# Patient Record
Sex: FEMALE | Race: WHITE | Employment: OTHER | URBAN - METROPOLITAN AREA
[De-identification: names, ages, dates, MRNs, and addresses within clinical notes are randomized per-mention and may not be internally consistent; named-entity substitution may affect disease eponyms.]

---

## 2017-05-04 ENCOUNTER — OFFICE VISIT (OUTPATIENT)
Dept: INTERNAL MEDICINE | Facility: CLINIC | Age: 82
End: 2017-05-04
Payer: COMMERCIAL

## 2017-05-04 VITALS
BODY MASS INDEX: 27.97 KG/M2 | TEMPERATURE: 98.4 F | OXYGEN SATURATION: 98 % | SYSTOLIC BLOOD PRESSURE: 126 MMHG | WEIGHT: 152 LBS | HEIGHT: 62 IN | HEART RATE: 67 BPM | DIASTOLIC BLOOD PRESSURE: 76 MMHG

## 2017-05-04 DIAGNOSIS — J45.30 MILD PERSISTENT ASTHMA WITHOUT COMPLICATION: ICD-10-CM

## 2017-05-04 DIAGNOSIS — Z13.1 SCREENING FOR DIABETES MELLITUS: ICD-10-CM

## 2017-05-04 DIAGNOSIS — Z00.00 MEDICARE ANNUAL WELLNESS VISIT, SUBSEQUENT: Primary | ICD-10-CM

## 2017-05-04 DIAGNOSIS — R35.0 URINARY FREQUENCY: ICD-10-CM

## 2017-05-04 LAB
ALBUMIN UR-MCNC: NEGATIVE MG/DL
APPEARANCE UR: CLEAR
BILIRUB UR QL STRIP: NEGATIVE
COLOR UR AUTO: YELLOW
GLUCOSE SERPL-MCNC: 96 MG/DL (ref 70–99)
GLUCOSE UR STRIP-MCNC: NEGATIVE MG/DL
HGB UR QL STRIP: NEGATIVE
KETONES UR STRIP-MCNC: NEGATIVE MG/DL
LEUKOCYTE ESTERASE UR QL STRIP: NEGATIVE
NITRATE UR QL: NEGATIVE
PH UR STRIP: 7.5 PH (ref 5–7)
SP GR UR STRIP: <=1.005 (ref 1–1.03)
URN SPEC COLLECT METH UR: ABNORMAL
UROBILINOGEN UR STRIP-ACNC: 0.2 EU/DL (ref 0.2–1)

## 2017-05-04 PROCEDURE — 81003 URINALYSIS AUTO W/O SCOPE: CPT | Performed by: INTERNAL MEDICINE

## 2017-05-04 PROCEDURE — 82947 ASSAY GLUCOSE BLOOD QUANT: CPT | Performed by: INTERNAL MEDICINE

## 2017-05-04 PROCEDURE — 36415 COLL VENOUS BLD VENIPUNCTURE: CPT | Performed by: INTERNAL MEDICINE

## 2017-05-04 PROCEDURE — 99397 PER PM REEVAL EST PAT 65+ YR: CPT | Performed by: INTERNAL MEDICINE

## 2017-05-04 NOTE — MR AVS SNAPSHOT
After Visit Summary   5/4/2017    Abril Aguilar    MRN: [id number]           Patient Information     Date Of Birth          1935        Visit Information        Provider Department      5/4/2017 9:00 AM Julio Cesar Carrero MD [facility]        Today's Diagnoses     Medicare annual wellness visit, subsequent    -  1    Mild persistent asthma without complication        Urinary frequency        Screening for diabetes mellitus          Care Instructions          Services Typically covered by Medicare Recommended Completed   Vaccines    Pneumonoccol    Influenza    Hepatitis B (if medium/high risk)     Once for patients after age 65    Yearly  Medium/high risk factors:    End Stage Kidney Disease    Hemophiliacs who received Factor XIII or IX concentrates    Clients of institutions for developmentally disabled    Persons who live in same house as a Hepatitis B carrier    Homosexual men    Illicit injectable drug users    Health care workers     Mammogram Covered: One-time screen between age 35-39, annually for age 40+     Pap and Pelvic Exam Covered: Annually if  high risk,  or childbearing age with abnormal Pap in last 3 years.  Q24 months for all other women     Prostate Cancer Screening    Digital rectal exam    PSA Covered: Annually for all men > age 50     Corolrectal Cancer Screening Screening colonoscopy every 10 years, more often for high risk patients     Diabetes Self-Management Training Requires referral by treating physician for patient with diabetes     Diabetes Screening    Fasting blood sugar or glucose tolerance test   Once yearly, twice yearly if prediabetic     Cardiovascular Screening Blood Tests    Total Cholesterol    HDL    Triglycerides Every 5 years     Medical Nutrition Therapy for Diabetes or Renal Disease Requires referral by treating physician for patient with diabetes or kidney disease     Glaucoma Screening Annually for patients with one of the  following risk factors:    Diabetes Mellitus    Family history of Glaucoma    -American age 50 and over    -American age 65 and over     Bone Mass Measurement Every 24 months if one of the following risk factors:    Estrogen deficiency    Vertebral abnormalities on x-ray indicative of Osteoporosis, Osteopenia, or Vertebral fracture    Receiving/expected to receive the equivalent of at least 5 mg of Prednisone per day for > 3 months    Hyperparathyroidism    Patient being monitored for response to Osteoporosis Therapy     One-time AAA screen  Must be ordered as part of Medicare IPPE   Any patient with a family history of AAA    Males Age 65-75, with history of smoking at least 100 cigarettes in lifetime     Smoking Cessation Counseling Beneficiaries who use tobacco are eligible to receive 2 cessation attempts per year; each attempt includes maximum of 4 sessions     HIV Screening Annually for beneficiaries at increased risk:       Increased risk for HIV infection is defined in the  National Coverage Determinations (NCD) Manual,  Publication 100-03 Sections 190.14 (diagnostic) and 210.7 (screening). See http://www.cms.gov/manuals/downloads/xco868w7_Swzc6.pdf and http://www.cms.gov/manuals/downloads/peo804a1_Vjgf4.pdf on the Internet.  Three times per pregnancy for beneficiaries who are pregnant.     Future Annual Wellness Visit Annually, for all beneficiaries.             Follow-ups after your visit        Who to contact     If you have questions or need follow up information about today's clinic visit or your schedule please contact [facility] directly at [phone].  Normal or non-critical lab and imaging results will be communicated to you by MyChart, letter or phone within 4 business days after the clinic has received the results. If you do not hear from us within 7 days, please contact the clinic through MyChart or phone. If you have a critical or abnormal lab result, we  "will notify you by phone as soon as possible.  Submit refill requests through [employer] or call your pharmacy and they will forward the refill request to us. Please allow 3 business days for your refill to be completed.          Additional Information About Your Visit        .[employer] Information     [email or web address] lets you send messages to your doctor, view your test results, renew your prescriptions, schedule appointments and more. To sign up, go to [email or web address] . Click on \"Log in\" on the left side of the screen, which will take you to the Welcome page. Then click on \"Sign up Now\" on the right side of the page.     You will be asked to enter the access code listed below, as well as some personal information. Please follow the directions to create your username and password.     Your access code is: [email or web address]  Expires: 2017 10:32 PM     Your access code will  in 90 days. If you need help or a new code, please call your [place] clinic or [phone].        Care EveryWhere ID     This is your Care EveryWhere ID. This could be used by other organizations to access your [place] medical records  [id number]        Your Vitals Were     Pulse Temperature Height Pulse Oximetry Breastfeeding? BMI (Body Mass Index)    67 98.4  F (36.9  C) (Oral) 5' 2\" (1.575 m) 98% No 27.8 kg/m2       Blood Pressure from Last 3 Encounters:   17 126/76   16 166/82   16 130/72    Weight from Last 3 Encounters:   17 152 lb (68.9 kg)   16 147 lb (66.7 kg)   16 152 lb (68.9 kg)              We Performed the Following     Glucose     UA reflex to Microscopic and Culture          Today's Medication Changes          These changes are accurate as of: 17 10:32 PM.  If you have any questions, ask your nurse or doctor.               Stop taking these medicines if you haven't already. Please contact your care team if you have questions.     fluticasone-salmeterol 115-21 MCG/ACT Inhaler   Commonly " known as:  ADVAIR-HFA   Stopped by:  Julio Cesar Carrero MD                    Primary Care Provider Office Phone # Fax #    Julio Cesar Carrero [phone]       [facility] [address]  [facility] [address]        Thank you!     Thank you for choosing [facility]  for your care. Our goal is always to provide you with excellent care. Hearing back from our patients is one way we can continue to improve our services. Please take a few minutes to complete the written survey that you may receive in the mail after your visit with us. Thank you!             Your Updated Medication List - Protect others around you: Learn how to safely use, store and throw away your medicines at www.disposemymeds.org.          This list is accurate as of: 5/4/17 10:32 PM.  Always use your most recent med list.                   Brand Name Dispense Instructions for use    aspirin 81 MG tablet     100    ONE DAILY       Azelaic Acid 15 % gel     50 g    Apply 0.5 inches topically 2 times daily Massage thin film gently into afected areas morning and evening. Profile only-       fluticasone-vilanterol 100-25 MCG/INH oral inhaler    BREO ELLIPTA     Inhale 1 puff into the lungs daily       multivitamin Tabs tablet      1 daily       triamcinolone 0.5 % cream    KENALOG    30 g    Apply sparingly to affected area twice a day  Until rash resolved       vitamin D 2000 UNITS tablet     100 tablet    Take 2,000 Units by mouth daily

## 2017-05-04 NOTE — NURSING NOTE
"Chief Complaint   Patient presents with     Wellness Visit     fasting       Initial /76  Pulse 67  Temp 98.4  F (36.9  C) (Oral)  Ht 5' 2\" (1.575 m)  Wt 152 lb (68.9 kg)  SpO2 98%  Breastfeeding? No  BMI 27.8 kg/m2 Estimated body mass index is 27.8 kg/(m^2) as calculated from the following:    Height as of this encounter: 5' 2\" (1.575 m).    Weight as of this encounter: 152 lb (68.9 kg).  Medication Reconciliation: complete    "

## 2017-05-04 NOTE — PATIENT INSTRUCTIONS
Services Typically covered by Medicare Recommended Completed   Vaccines    Pneumonoccol    Influenza    Hepatitis B (if medium/high risk)     Once for patients after age 65    Yearly  Medium/high risk factors:    End Stage Kidney Disease    Hemophiliacs who received Factor XIII or IX concentrates    Clients of institutions for developmentally disabled    Persons who live in same house as a Hepatitis B carrier    Homosexual men    Illicit injectable drug users    Health care workers     Mammogram Covered: One-time screen between age 35-39, annually for age 40+     Pap and Pelvic Exam Covered: Annually if  high risk,  or childbearing age with abnormal Pap in last 3 years.  Q24 months for all other women     Prostate Cancer Screening    Digital rectal exam    PSA Covered: Annually for all men > age 50     Corolrectal Cancer Screening Screening colonoscopy every 10 years, more often for high risk patients     Diabetes Self-Management Training Requires referral by treating physician for patient with diabetes     Diabetes Screening    Fasting blood sugar or glucose tolerance test   Once yearly, twice yearly if prediabetic     Cardiovascular Screening Blood Tests    Total Cholesterol    HDL    Triglycerides Every 5 years     Medical Nutrition Therapy for Diabetes or Renal Disease Requires referral by treating physician for patient with diabetes or kidney disease     Glaucoma Screening Annually for patients with one of the following risk factors:    Diabetes Mellitus    Family history of Glaucoma    -American age 50 and over    -American age 65 and over     Bone Mass Measurement Every 24 months if one of the following risk factors:    Estrogen deficiency    Vertebral abnormalities on x-ray indicative of Osteoporosis, Osteopenia, or Vertebral fracture    Receiving/expected to receive the equivalent of at least 5 mg of Prednisone per day for > 3 months    Hyperparathyroidism    Patient being monitored for  response to Osteoporosis Therapy     One-time AAA screen  Must be ordered as part of Medicare IPPE   Any patient with a family history of AAA    Males Age 65-75, with history of smoking at least 100 cigarettes in lifetime     Smoking Cessation Counseling Beneficiaries who use tobacco are eligible to receive 2 cessation attempts per year; each attempt includes maximum of 4 sessions     HIV Screening Annually for beneficiaries at increased risk:       Increased risk for HIV infection is defined in the  National Coverage Determinations (NCD) Manual,  Publication 100-03 Sections 190.14 (diagnostic) and 210.7 (screening). See http://www.cms.gov/manuals/downloads/vxi748s9_Pqdj8.pdf and http://www.cms.gov/manuals/downloads/hcz050x4_Amdo7.pdf on the Internet.  Three times per pregnancy for beneficiaries who are pregnant.     Future Annual Wellness Visit Annually, for all beneficiaries.

## 2017-05-05 ASSESSMENT — ASTHMA QUESTIONNAIRES: ACT_TOTALSCORE: 25

## 2017-08-09 ENCOUNTER — TRANSFERRED RECORDS (OUTPATIENT)
Dept: HEALTH INFORMATION MANAGEMENT | Facility: CLINIC | Age: 82
End: 2017-08-09

## 2018-05-17 ENCOUNTER — OFFICE VISIT (OUTPATIENT)
Dept: INTERNAL MEDICINE | Facility: CLINIC | Age: 83
End: 2018-05-17
Payer: COMMERCIAL

## 2018-05-17 VITALS
SYSTOLIC BLOOD PRESSURE: 126 MMHG | TEMPERATURE: 99 F | HEIGHT: 62 IN | HEART RATE: 90 BPM | WEIGHT: 147 LBS | RESPIRATION RATE: 16 BRPM | DIASTOLIC BLOOD PRESSURE: 74 MMHG | BODY MASS INDEX: 27.05 KG/M2

## 2018-05-17 DIAGNOSIS — H61.22 IMPACTED CERUMEN OF LEFT EAR: ICD-10-CM

## 2018-05-17 DIAGNOSIS — Z13.1 SCREENING FOR DIABETES MELLITUS: Primary | ICD-10-CM

## 2018-05-17 DIAGNOSIS — J45.30 MILD PERSISTENT ASTHMA WITHOUT COMPLICATION: ICD-10-CM

## 2018-05-17 DIAGNOSIS — Z00.00 MEDICARE ANNUAL WELLNESS VISIT, SUBSEQUENT: ICD-10-CM

## 2018-05-17 LAB — GLUCOSE SERPL-MCNC: 95 MG/DL (ref 70–99)

## 2018-05-17 PROCEDURE — 69210 REMOVE IMPACTED EAR WAX UNI: CPT | Mod: 53 | Performed by: INTERNAL MEDICINE

## 2018-05-17 PROCEDURE — 82947 ASSAY GLUCOSE BLOOD QUANT: CPT | Performed by: INTERNAL MEDICINE

## 2018-05-17 PROCEDURE — 36415 COLL VENOUS BLD VENIPUNCTURE: CPT | Performed by: INTERNAL MEDICINE

## 2018-05-17 PROCEDURE — 99397 PER PM REEVAL EST PAT 65+ YR: CPT | Mod: 25 | Performed by: INTERNAL MEDICINE

## 2018-05-17 ASSESSMENT — ACTIVITIES OF DAILY LIVING (ADL)
CURRENT_FUNCTION: NO ASSISTANCE NEEDED
I_NEED_ASSISTANCE_FOR_THE_FOLLOWING_DAILY_ACTIVITIES:: NO ASSISTANCE IS NEEDED

## 2018-05-17 ASSESSMENT — PAIN SCALES - GENERAL: PAINLEVEL: NO PAIN (0)

## 2018-05-17 NOTE — PROGRESS NOTES
SUBJECTIVE:   Abril Aguilar is a 82 year old female who presents for Preventive Visit.    Are you in the first 12 months of your Medicare Part B coverage?  No    Healthy Habits:  Answers for HPI/ROS submitted by the patient on 5/17/2018   Annual Exam:  Getting at least 3 servings of Calcium per day:: Yes  Bi-annual eye exam:: Yes  Dental care twice a year:: NO  Sleep apnea or symptoms of sleep apnea:: None  Diet:: Regular (no restrictions)  Taking medications regularly:: Yes  Medication side effects:: None  Additional concerns today:: No  Activities of Daily Living: no assistance needed  Home safety: lack of grab bars in the bathroom  Hearing Impairment:: no hearing concerns  PHQ-2 Score: 0        Ability to successfully perform activities of daily living: Yes, no assistance needed    Home safety:  none identified     Hearing impairment: No    Fall risk:  Fallen 2 or more times in the past year?: No  Any fall with injury in the past year?: No        COGNITIVE SCREEN  1) Repeat 3 items (Banana, Sunrise, Chair)    2) Clock draw: NORMAL  3) 3 item recall: Recalls 3 objects  Results: NORMAL clock, 1-2 items recalled: COGNITIVE IMPAIRMENT LESS LIKELY    Mini-CogTM Copyright S Melany. Licensed by the author for use in [facility]; reprinted with permission (soob@[facility]). All rights reserved.                Reviewed and updated as needed this visit by clinical staff         Reviewed and updated as needed this visit by Provider        Social History   Substance Use Topics     Smoking status: Never Smoker     Smokeless tobacco: Never Used     Alcohol use No       If you drink alcohol do you typically have >3 drinks per day or >7 drinks per week? No                        Today's PHQ-2 Score:   PHQ-2 ( 1999 Pf[employer]) 5/4/2017 5/2/2016   Q1: Little interest or pleasure in doing things 0 0   Q2: Feeling down, depressed or hopeless 0 0   PHQ-2 Score 0 0       Do you feel safe in your environment - YES    Do  you have a Health Care Directive?: Yes: Patient states has Advance Directive and will bring in a copy to clinic.    Current providers sharing in care for this patient include:   Patient Care Team:  Julio Cesar Carrero MD as PCP - General (Internal Medicine)    The following health maintenance items are reviewed in Epic and correct as of today:  Health Maintenance   Topic Date Due     ASTHMA ACTION PLAN Q1 YR  05/02/2017     ADVANCE DIRECTIVE PLANNING Q5 YRS  10/09/2017     ASTHMA CONTROL TEST Q6 MOS  11/04/2017     FALL RISK ASSESSMENT  05/04/2018     INFLUENZA VACCINE (Season Ended) 09/01/2018     TETANUS IMMUNIZATION (SYSTEM ASSIGNED)  05/27/2025     DEXA SCAN SCREENING (SYSTEM ASSIGNED)  Completed     PNEUMOCOCCAL  Completed     Labs reviewed in EPIC        ROS:  CONSTITUTIONAL: NEGATIVE for fever, chills. Weight down 5 pounds with diet  INTEGUMENTARY/SKIN: NEGATIVE for worrisome rashes, moles or lesions  EYES: NEGATIVE for recent vision changes or irritation.   Minimal vision right eye. Gets injections every 8 weeks for macular degeneration  ENT/MOUTH: NEGATIVE for ear, mouth and throat problems  RESP: NEGATIVE for significant cough or SOB with meds. Seeing Pulmonary in August for follow-up  BREAST: NEGATIVE for masses, tenderness or discharge. Refuses further mammograms  CV: NEGATIVE for chest pain, palpitations or peripheral edema  GI: NEGATIVE for nausea, abdominal pain, heartburn, or change in bowel habits  : NEGATIVE for frequency, dysuria, or hematuria. Has nocturia x2-3.  2 cups coffee in AM. Rare soda. Refuses meds for treatment. No ETOH.   MUSCULOSKELETAL: NEGATIVE for significant arthralgias or myalgia that limit activity. Rare stiffness in thumbs or low back.  Better with stretching in AM  NEURO: NEGATIVE for weakness, dizziness or paresthesias  ENDOCRINE: NEGATIVE for temperature intolerance.   HEME: NEGATIVE for bleeding problems  PSYCHIATRIC: NEGATIVE for changes in mood or affect    OBJECTIVE:   BP  "126/74  Pulse 90  Temp 99  F (37.2  C) (Oral)  Resp 16  Ht 5' 2\" (1.575 m)  Wt 147 lb (66.7 kg)  BMI 26.89 kg/m2 Estimated body mass index is 27.8 kg/(m^2) as calculated from the following:    Height as of 5/4/17: 5' 2\" (1.575 m).    Weight as of 5/4/17: 152 lb (68.9 kg).  EXAM:   General appearance - healthy, alert, no distress  Skin - No rashes or lesions.  Head - normocephalic, atraumatic  Eyes - KURT, EOMI, fundi exam with nondilated pupils negative.  Ears - External ears normal.  Right canal clear. Left canal obstructed with cerumen caught in hairs. Tried to remove but unable due top pain with hairs  Nose/Sinuses - Nares normal. Septum midline. Mucosa normal. No drainage or sinus tenderness.  Oropharynx - No erythema, no adenopathy, no exudates.  Neck - Supple without adenopathy or thyromegaly. No bruits.  Lungs - Clear to auscultation without wheezes/rhonchi.  Heart - Regular rate and rhythm without murmurs, clicks, or gallops.  Nodes - No supraclavicular, axillary, or inguinal adenopathy palpable.  Breasts - deferred  Abdomen - Abdomen soft, non-tender. BS normal. No masses or hepatosplenomegaly palpable. No bruits.  Extremities -No cyanosis, clubbing or edema.    Musculoskeletal - Spine ROM normal. Muscular strength intact.   Peripheral pulses - radial=4/4, femoral=4/4, posterior tibial=4/4, dorsalis pedis=4/4,  Neuro - Gait normal. Reflexes normal and symmetric. Sensation grossly WNL.  Genital/Rectal - deferred      ASSESSMENT / PLAN:   1. Medicare annual wellness visit, subsequent  Screening lab as below. Pt refuses other HCM    2. Screening for diabetes mellitus  - Glucose    3. Mild persistent asthma without complication  Controlled. Continue meds prescribed by Pulmonary. Will see Dr Roberts this August for annual f/u    4. Impacted cerumen of left ear  Not able to tolerate procedure in clinic as cerumen wrapped around ear canal hair. Will treat with Debrox      End of Life Planning:  Patient " "currently has an advanced directive: Yes.  Practitioner is supportive of decision.    COUNSELING:  Reviewed preventive health counseling, as reflected in patient instructions    BP Screening:   Last 3 BP Readings:    BP Readings from Last 3 Encounters:   05/17/18 126/74   05/04/17 126/76   09/28/16 166/82       The following was recommended to the patient:  Re-screen BP within a year and recommended lifestyle modifications    Estimated body mass index is 27.8 kg/(m^2) as calculated from the following:    Height as of 5/4/17: 5' 2\" (1.575 m).    Weight as of 5/4/17: 152 lb (68.9 kg).       reports that she has never smoked. She has never used smokeless tobacco.      Appropriate preventive services were discussed with this patient, including applicable screening as appropriate for cardiovascular disease, diabetes, osteopenia/osteoporosis, and glaucoma.  As appropriate for age/gender, discussed screening for colorectal cancer, prostate cancer, breast cancer, and cervical cancer. Checklist reviewing preventive services available has been given to the patient.    Reviewed patients plan of care and provided an AVS. The Basic Care Plan (routine screening as documented in Health Maintenance) for Abril meets the Care Plan requirement. This Care Plan has been established and reviewed with the Patient.    Counseling Resources:  ATP IV Guidelines  Pooled Cohorts Equation Calculator  Breast Cancer Risk Calculator  FRAX Risk Assessment  ICSI Preventive Guidelines  Dietary Guidelines for Americans, 2010  USDA's MyPlate  ASA Prophylaxis  Lung CA Screening      PLAN:  Continue current meds   Check with insurance re: Shingrix vaccine for shingles prevention. Check if covered and if they prefer it to be given in clinic or pharmacy. Schedule nurse only appt if wish to receive in clinic. 2 shot series done 2-6 months apart  Glucose/sugar lab today  Debrox 4 drops in left ear and let sit for 2 minutes. Then irrigate with lukewarm water " and do daily for 1 week  Pt declines mammogram and bone density study/ treatment      Julio Cesar Carrero MD  [facility]

## 2018-05-17 NOTE — MR AVS SNAPSHOT
"              After Visit Summary   5/17/2018    Abril Aguilar    MRN: [id number]           Patient Information     Date Of Birth          1935        Visit Information        Provider Department      5/17/2018 8:30 AM Julio Cesar Carrero MD [facility]        Care Instructions    Continue current meds   Check with insurance re: Shingrix vaccine for shingles prevention. Check if covered and if they prefer it to be given in clinic or pharmacy. Schedule nurse only appt if wish to receive in clinic. 2 shot series done 2-6 months apart  Glucose/sugar lab today  Debrox 4 drops in left ear and let sit for 2 minutes. Then irrigate with lukewarm water and do daily for 1 week                   Follow-ups after your visit        Who to contact     If you have questions or need follow up information about today's clinic visit or your schedule please contact [facility] directly at [phone].  Normal or non-critical lab and imaging results will be communicated to you by [employer]hart, letter or phone within 4 business days after the clinic has received the results. If you do not hear from us within 7 days, please contact the clinic through [employer]hart or phone. If you have a critical or abnormal lab result, we will notify you by phone as soon as possible.  Submit refill requests through [employer] or call your pharmacy and they will forward the refill request to us. Please allow 3 business days for your refill to be completed.          Additional Information About Your Visit        [employer]row[employer] Information     [employer] lets you send messages to your doctor, view your test results, renew your prescriptions, schedule appointments and more. To sign up, go to [email or web address] . Click on \"Log in\" on the left side of the screen, which will take you to the Welcome page. Then click on \"Sign up Now\" on the right side of the page.     You will be asked to enter the access code listed below, as " "well as some personal information. Please follow the directions to create your username and password.     Your access code is: [email or web address]  Expires: 8/15/2018  9:08 AM     Your access code will  in 90 days. If you need help or a new code, please call your [place] clinic or [phone].        Care EveryWhere ID     This is your Care EveryWhere ID. This could be used by other organizations to access your [place] medical records  [id number]        Your Vitals Were     Pulse Temperature Respirations Height BMI (Body Mass Index)       90 99  F (37.2  C) (Oral) 16 5' 2\" (1.575 m) 26.89 kg/m2        Blood Pressure from Last 3 Encounters:   18 126/74   17 126/76   16 166/82    Weight from Last 3 Encounters:   18 147 lb (66.7 kg)   17 152 lb (68.9 kg)   16 147 lb (66.7 kg)              We Performed the Following     Asthma Action Plan (AAP)          Today's Medication Changes          These changes are accurate as of 18  9:08 AM.  If you have any questions, ask your nurse or doctor.               Stop taking these medicines if you haven't already. Please contact your care team if you have questions.     Azelaic Acid 15 % gel   Stopped by:  Julio Cesar Carrero MD                    Primary Care Provider Office Phone # Fax #    Julio Cesar Carrero [phone]       [address] [place] [id number]        Equal Access to Services     [facility]: Hadjoshua Chacon, waaxda luqadaha, qaybta kaalmanancy van. So [facility] [phone].    ATENCIÓN: Si habla español, tiene a elkins disposición servicios gratuitos de asistencia lingüística. Llame al [phone].    We comply with applicable federal civil rights laws and [place] laws. We do not discriminate on the basis of race, color, national origin, age, disability, sex, sexual orientation, or gender identity.            Thank you!     Thank you for choosing [facility]  for your care. Our goal is always to provide you with excellent care. Hearing back from our patients is one way we can continue to improve our services. Please take a few minutes to complete the written survey that you may receive in the mail after your visit with us. Thank you!             Your Updated Medication List - Protect others around you: Learn how to safely use, store and throw away your medicines at www.disposemymeds.org.          This list is accurate as of 5/17/18  9:08 AM.  Always use your most recent med list.                   Brand Name Dispense Instructions for use Diagnosis    aspirin 81 MG tablet     100    ONE DAILY        fluticasone-vilanterol 100-25 MCG/INH oral inhaler    BREO ELLIPTA     Inhale 1 puff into the lungs daily        multivitamin Tabs tablet      1 daily        vitamin D 2000 units tablet     100 tablet    Take 2,000 Units by mouth daily    Hyponatremia, Hepatitis, Vitamin D deficiency

## 2018-05-17 NOTE — LETTER
[facility]  [address] [address]  [phone]      5/20/2018       Abril Aguilar  [address] [facility] [address]  [facility] [address]        Dear Abril,  Here are your most recent lab results. Unless commented on below, mild variation of results  outside the normal range are not clinically signicant.    Resulted Orders   Glucose   Result Value Ref Range    Glucose 95 70 - 99 mg/dL       Glucose/Sugar lab results were normal.  No evidence of diabetes.  No further recommendations at this time.  Continue diet and exercise.    If you have further questions/concerns regarding the results, I would ask that you bring them to your next follow-up appointment with me and I would be happy to review them with you further.      Sincerely,      Julio Cesar Carrero MD  Internal Medicine

## 2018-05-17 NOTE — PATIENT INSTRUCTIONS
Continue current meds   Check with insurance re: Shingrix vaccine for shingles prevention. Check if covered and if they prefer it to be given in clinic or pharmacy. Schedule nurse only appt if wish to receive in clinic. 2 shot series done 2-6 months apart  Glucose/sugar lab today  Debrox 4 drops in left ear and let sit for 2 minutes. Then irrigate with lukewarm water and do daily for 1 week

## 2018-05-17 NOTE — LETTER
My Asthma Action Plan  Name: Abril Aguilar   Date: 5/17/2018   My doctor: Julio Cesar Carrero   My clinic: [address] [address] [phone] My Asthma Severity: mild persistent    My Control Medicine: BREO  My Rescue Medicine: Albuterol     Pharmacy:    [id number] [place] [facility] [place], [place] - [address]  [facility] - [facility] [address] [place] [address]  Avoid these possible asthma triggers: smoke, upper respiratory infections, dust mites, pollens, animal dander, insects/rodents, mold, humidity, aspirin, strong odors and fumes, occupational exposure, exercise or sports, emotions, cold air and Gastric Reflux        GREEN ZONE   Good Control    I feel good    No cough or wheeze    Can work, sleep and play without asthma symptoms       Take your asthma control medicine every day.    1. If exercise triggers your asthma, take albuterol 2 puffs      15 minutes before exercise or sports, and    During exercise if you have asthma symptoms  2. Spacer to use with inhaler: no              YELLOW ZONE Getting Worse  I have ANY of these:    I do not feel good    Cough or wheeze    Chest feels tight    Wake up at night   1. Keep taking your Green Zone medications  2. Start taking your rescue medicine:    every 20 minutes for up to 1 hour. Then every 4 hours for 24-48 hours.  3. If you stay in the Yellow Zone for more than 12-24 hours, contact your doctor.  4. If you do not return to the Green Zone in 12-24 hours or you get worse, start taking your oral steroid medicine if prescribed by your provider.           RED ZONE Medical Alert - Get Help  I have ANY of these:    I feel awful    Medicine is not helping    Breathing getting harder    Trouble walking or talking    Nose opens wide to breathe       1. Take your rescue medicine NOW  2. If your provider has prescribed an oral steroid medicine, start taking it NOW  3. Call your doctor NOW  4. If  you are still in the Red Zone after 20 minutes and you have not reached your doctor:    Take your rescue medicine again and    Call 911 or go to the emergency room right away    See your regular doctor within 2 weeks of an Emergency Room or Urgent Care visit for follow-up treatment.        Asthma Health Reminders:    * Meet with Asthma Educator annually, if indicated  * Flu shot each year in the fall  * Pneumonia shot    Electronically signed May 17, 2018 by: Caroline Grey                          Asthma Triggers  How To Control Things That Make Your Asthma Worse    Triggers are things that make your asthma worse.  Look at the list below to help you find your triggers and what you can do about them.  You can help prevent asthma flare-ups by staying away from your triggers.      Trigger                                                          What you can do   Cigarette Smoke  Tobacco smoke can make asthma worse. Do not allow smoking in your home, car or around you.  Be sure no one smokes at a child s day care or school.  If you smoke, ask your health care provider for ways to help you quick.  Ask family members to quit too.  Ask your health care provider for a referral to Quit plan to help you quit smoking, or call 9-922-746-PLAN.     Colds, Flu, Bronchitis  These are common triggers of asthma. Wash your hands often.  Don t touch your eyes, nose or mouth.  Get a flu shot every year.     Dust Mites  These are tiny bugs that live in cloth or carpet. They are too small to see. Wash sheets and blankets in hot water every week.   Encase pillows and mattress in dust mite proof covers.  Avoid having carpet if you can. If you have carpet, vacuum weekly.   Use a dust mask and HEPA vacuum.   Pollen and Outdoor Mold  Some people are allergic to trees, grass, or weed pollen, or molds. Try to keep your windows closed.  Limit time out doors when pollen count is high.   Ask you health care provider about taking medicine during  allergy season.     Animal Dander  Some people are allergic to skin flakes, urine or saliva from pets with fur or feathers. Keep pets with fur or feathers out of your home.    If you can t keep the pet outdoors, then keep the pet out of your bedroom.  Keep the bedroom door closed.  Keep pets off cloth furniture and away from stuffed toys.     Mice, Rats, and Cockroaches  Some people are allergic to the waste from these pets.   Cover food and garbage.  Clean up spills and food crumbs.  Store grease in the refrigerator.   Keep food out of the bedroom.   Indoor Mold  This can be a trigger if your home has high moisture Fix leaking faucets, pipes, or other sources of water.   Clean moldy surfaces.  Dehumidify basement if it is damp and smelly.   Smoke, Strong Odors, and Sprays  These can reduce air quality. Stay away from strong odors and sprays, such as perfume, powder, hair spray, paints, smoke incense, paints, cleaning products, candles and new carpet.   Exercise or Sports  Some people with asthma have this trigger. Be active!  Ask you doctor about taking medicine before sports or exercise to prevent symptoms.    Warm up for 5-10 minutes before and after sports or exercise.     Other Triggers of Asthma  Cold air:  Cover your nose and mouth with a scarf.  Sometimes laughing or crying can be a trigger.  Some medicines and food can trigger asthma.

## 2018-06-06 ENCOUNTER — TRANSFERRED RECORDS (OUTPATIENT)
Dept: HEALTH INFORMATION MANAGEMENT | Facility: CLINIC | Age: 83
End: 2018-06-06

## 2018-07-30 ENCOUNTER — OFFICE VISIT (OUTPATIENT)
Dept: INTERNAL MEDICINE | Facility: CLINIC | Age: 83
End: 2018-07-30
Payer: COMMERCIAL

## 2018-07-30 VITALS
TEMPERATURE: 98.6 F | HEART RATE: 74 BPM | BODY MASS INDEX: 27.6 KG/M2 | OXYGEN SATURATION: 97 % | DIASTOLIC BLOOD PRESSURE: 80 MMHG | WEIGHT: 150 LBS | RESPIRATION RATE: 16 BRPM | SYSTOLIC BLOOD PRESSURE: 126 MMHG | HEIGHT: 62 IN

## 2018-07-30 DIAGNOSIS — M54.2 CERVICALGIA: Primary | ICD-10-CM

## 2018-07-30 PROCEDURE — 99213 OFFICE O/P EST LOW 20 MIN: CPT | Performed by: INTERNAL MEDICINE

## 2018-07-30 NOTE — MR AVS SNAPSHOT
After Visit Summary   7/30/2018    Abril Aguilar    MRN: [id number]           Patient Information     Date Of Birth          1935        Visit Information        Provider Department      7/30/2018 11:00 AM Julio Cesar Carrero MD [facility]        Today's Diagnoses     Cervicalgia    -  1      Care Instructions     Tylenol/ Acetominophen ES 500mg tab, 2 tabs twice a day as needed for pain   Heat/stretching  in AM  Referral to [facility] for physical therapy. They will call to schedule          Follow-ups after your visit        Additional Services     [facility], AND CHIR[facility]RACTIC REFERRAL       **This order will print in the [facility] Scheduling Office**    Physical Therapy, Hand Therapy and Chiropractic Care are available through:    *[facility]  *[facility]  *[facility]    Call one number to schedule at any of the above locations: [phone].    Your provider has referred you to: Physical Therapy at [facility] or [facility]    Indication/Reason for Referral: neck/right trapezial pain for 2 weeks  Onset of Illness: 2 weeks  Therapy Orders: Evaluate and Treat  Special Programs: None  Special Request: None    Anette Cheema      Additional Comments for the Therapist or Chiropractor:     Please be aware that coverage of these services is subject to the terms and limitations of your health insurance plan.  Call member services at your health plan with any benefit or coverage questions.      Please bring the following to your appointment:    *Your personal calendar for scheduling future appointments  *Comfortable clothing                  Who to contact     If you have questions or need follow up information about today's clinic visit or your schedule please contact [facility] directly at [phone].  Normal or non-critical lab and imaging results will be communicated to you by MyChart, letter or phone within 4  "business days after the clinic has received the results. If you do not hear from us within 7 days, please contact the clinic through [employer] or phone. If you have a critical or abnormal lab result, we will notify you by phone as soon as possible.  Submit refill requests through [employer] or call your pharmacy and they will forward the refill request to us. Please allow 3 business days for your refill to be completed.          Additional Information About Your Visit        Care EveryWhere ID     This is your Care EveryWhere ID. This could be used by other organizations to access your [facility] medical records  [id number]        Your Vitals Were     Pulse Temperature Respirations Height Pulse Oximetry BMI (Body Mass Index)    74 98.6  F (37  C) 16 5' 2\" (1.575 m) 97% 27.44 kg/m2       Blood Pressure from Last 3 Encounters:   07/30/18 126/80   05/17/18 126/74   05/04/17 126/76    Weight from Last 3 Encounters:   07/30/18 150 lb (68 kg)   05/17/18 147 lb (66.7 kg)   05/04/17 152 lb (68.9 kg)              We Performed the Following     [facility], AND CH[facility]RACTIC REFERRAL        Primary Care Provider Office Phone # Fax #    Julio Cesar Carrero [phone]       [address] [place] [address]        Equal Access to Services     BRITTANY ONEIL : Hadii aad ku hadasho Soomaali, waaxda luqadaha, qaybta kaalmada adeegyada, waxay bree noland . So [facility] [phone].    ATENCIÓN: Si habla español, tiene a elkins disposición servicios gratuitos de asistencia lingüística. Llame al [phone].    We comply with applicable federal civil rights laws and [place] laws. We do not discriminate on the basis of race, color, national origin, age, disability, sex, sexual orientation, or gender identity.            Thank you!     Thank you for choosing [facility]  for your care. Our goal is always to provide you with excellent care. Hearing back from our patients is one way we can " continue to improve our services. Please take a few minutes to complete the written survey that you may receive in the mail after your visit with us. Thank you!             Your Updated Medication List - Protect others around you: Learn how to safely use, store and throw away your medicines at www.disposemymeds.org.          This list is accurate as of 7/30/18 11:22 AM.  Always use your most recent med list.                   Brand Name Dispense Instructions for use Diagnosis    aspirin 81 MG tablet     100    ONE DAILY        fluticasone-vilanterol 100-25 MCG/INH oral inhaler    BREO ELLIPTA     Inhale 1 puff into the lungs daily        multivitamin Tabs tablet      1 daily        vitamin D 2000 units tablet     100 tablet    Take 2,000 Units by mouth daily    Hyponatremia, Hepatitis, Vitamin D deficiency

## 2018-07-30 NOTE — PATIENT INSTRUCTIONS
Tylenol/ Acetominophen ES 500mg tab, 2 tabs twice a day as needed for pain   Heat/stretching  in AM  Referral to [facility] for physical therapy. They will call to schedule

## 2018-07-30 NOTE — PROGRESS NOTES
"  SUBJECTIVE:   Abril Aguilar is a 82 year old female who presents to clinic today for the following health issues:    Chief Complaint   Patient presents with     Shoulder Pain       Musculoskeletal problem/pain      Duration: x 2 weeks    Description  Location:  Right trapezial  area    Intensity:  mild, moderate    Accompanying signs and symptoms: radiation of pain to neck    History  Previous similar problem: no   Previous evaluation:  none    Precipitating or alleviating factors:  Trauma or overuse: no   Aggravating factors include: sitting and overuse    Therapies tried and outcome: heat and ice, Ibuprofen-No relief    Pt's past medical history, family history, habits, medications and allergies were reviewed with the patient today.  See snap shot for  HCM status. Most recent lab results reviewed with pt. Problem list and histories reviewed & adjusted, as indicated.  Additional history as below:    Had slept  at son's home and had new pillow. Started after that. Also had  to fly on airplane to [place]  to travel to a wedding.  Worse with lying  down  No trauma besides above   No radicular sx to the  RUE,. No pain in right shoulder  Not related to exertion     Additional ROS:   Constitutional, HEENT, Cardiovascular, Pulmonary, GI and , Neuro, MSK and Psych review of systems/symptoms are otherwise negative or unchanged from previous, except as noted above.      OBJECTIVE:  /80  Pulse 74  Temp 98.6  F (37  C)  Resp 16  Ht 5' 2\" (1.575 m)  Wt 150 lb (68 kg)  SpO2 97%  BMI 27.44 kg/m2   Estimated body mass index is 27.44 kg/(m^2) as calculated from the following:    Height as of this encounter: 5' 2\" (1.575 m).    Weight as of this encounter: 150 lb (68 kg).  Neck: no adenopathy. Thyroid normal to palpation. No bruits  Pulm: Lungs clear to auscultation   CV: Regular rates and rhythm  Ext: Peripheral pulses intact. No edema.  Neuro: Normal strength and tone, sensory exam grossly normal  MSK: Tenderness " to palpation right trapezial musculature and paracervical musculature. Mild reduced ROM to F?E and R/L lateral movement neck. No nuchal rigidity.  No tenderness to ROM right shoulder    Assessment/Plan: (See plan discussion below for further details)  1. Cervicalgia   Appears musculoskeletal strain. No radicular sx  - [facility], AND CHIR[facility]RACTIC REFERRAL    Plan discussion:   Tylenol/ Acetominophen ES 500mg tab, 2 tabs twice a day as needed for pain   Heat/stretching  in AM. May use cold later part of the day as need for pain  Referral to [facility] for physical therapy. They will call to schedule       Julio Cesar Carrero MD  Internal Medicine Department  [facility]

## 2018-08-01 ENCOUNTER — THERAPY VISIT (OUTPATIENT)
Dept: PHYSICAL THERAPY | Facility: CLINIC | Age: 83
End: 2018-08-01
Attending: INTERNAL MEDICINE
Payer: MEDICARE

## 2018-08-01 DIAGNOSIS — M54.2 NECK PAIN: Primary | ICD-10-CM

## 2018-08-01 PROCEDURE — 97161 PT EVAL LOW COMPLEX 20 MIN: CPT | Mod: GP | Performed by: PHYSICAL THERAPIST

## 2018-08-01 PROCEDURE — G8982 BODY POS GOAL STATUS: HCPCS | Mod: GP | Performed by: PHYSICAL THERAPIST

## 2018-08-01 PROCEDURE — G8981 BODY POS CURRENT STATUS: HCPCS | Mod: GP | Performed by: PHYSICAL THERAPIST

## 2018-08-01 PROCEDURE — 97110 THERAPEUTIC EXERCISES: CPT | Mod: GP | Performed by: PHYSICAL THERAPIST

## 2018-08-01 NOTE — PROGRESS NOTES
[facility] Initial Evaluation  Subjective:  Patient is a 82 year old female presenting with rehab cervical spine hpi. The history is provided by the patient. No  was used.   Abril Aguilar is a 82 year old female with a cervical spine condition.  Condition occurred with:  Insidious onset.  Condition occurred: for unknown reasons.  This is a new condition  Patient reports a gradual onset of right thoracic pain beginning about 2-3 weeks ago.  Patient attributes onset of neck pain to sleeping wrong.  Patient c/o difficulty sleeping comfortably and sitting comfortably. Physical therapy was ordered 7/30/2018.    Patient reports pain:  Thoracic right side and cervical right side.    Pain is described as aching and is constant and reported as 5/10.  Associated symptoms:  Loss of motion/stiffness (patient denies radicular symptoms). Pain is the same all the time.  Symptoms are exacerbated by sitting and lying down and relieved by heat, ice, NSAID's and other (standing).  Since onset symptoms are gradually worsening.  Special testing: none.  Previous treatment: none.    General health as reported by patient is excellent.  Pertinent medical history includes:  Asthma.  Medical allergies: no.  Other surgeries include:  Other.  Current medications:  Anti-inflammatory.  Current occupation is Retired.        Barriers include:  None as reported by the patient.    Red flags:  None as reported by the patient.                        Objective:  Standing Alignment:    Cervical/Thoracic:  Forward head and thoracic kyphosis increased                                    Cervical/Thoracic Evaluation    AROM:  AROM Cervical:    Flexion:            WNL  Extension:       Min Loss  Rotation:         Left: Min Loss     Right: Min Loss  Side Bend:      Left: Mod Loss     Right:  Mod Loss  AROM Thoracic:    Flexion:             WNL  Extension:          Min Loss  Rotation:            Left:     Right:     Strength: Poor deep neck flexor activation  Headaches: none  Cervical Myotomes:        C4 (shrug):  Left: 5    Right: 5  C5 (Deltoid):  Left: 5    Right: 5  C6 (Biceps):  Left: 5    Right: 5  C7 (Triceps):  Left: 5    Right: 5  C8 (Thumb Ext): Left: 5    Right: 5  T1 (Intrinsics): Left: 5    Right: 5        Cervical Palpation:  : R costovertebral joint ~T4 pain with palpation and decreased mobility.    Tenderness present at Right:    Rhomboids and Upper Trap  Tenderness not present at Right:      Levator; Erector Spinae or Suboccipitals      Spinal Segmental Conclusions:  Multi-segmental hypomobility                                                  General     ROS    Assessment/Plan:    Patient is a 82 year old female with cervical complaints.    Patient has the following significant findings with corresponding treatment plan.                Diagnosis 1:  Cervical vs Thoracic Derangement  Pain -  manual therapy, self management, education, directional preference exercise and home program  Decreased ROM/flexibility - manual therapy, therapeutic exercise, therapeutic activity and home program  Decreased joint mobility - manual therapy, therapeutic exercise, therapeutic activity and home program  Impaired muscle performance - neuro re-education and home program  Decreased function - therapeutic activities and home program  Impaired posture - neuro re-education, therapeutic activities and home program    Therapy Evaluation Codes:   1) History comprised of:   Personal factors that impact the plan of care:      None.    Comorbidity factors that impact the plan of care are:      None.     Medications impacting care: Anti-inflammatory.  2) Examination of Body Systems comprised of:   Body structures and functions that impact the plan of care:      Cervical spine and Thoracic Spine.   Activity limitations that impact the plan of care are:      Sitting and Sleeping.  3) Clinical presentation characteristics  are:   Stable/Uncomplicated.  4) Decision-Making    Low complexity using standardized patient assessment instrument and/or measureable assessment of functional outcome.  Cumulative Therapy Evaluation is: Low complexity.    Previous and current functional limitations:  (See Goal Flow Sheet for this information)    Short term and Long term goals: (See Goal Flow Sheet for this information)     Communication ability:  Patient appears to be able to clearly communicate and understand verbal and written communication and follow directions correctly.  Treatment Explanation - The following has been discussed with the patient:   RX ordered/plan of care  Anticipated outcomes  Possible risks and side effects  This patient would benefit from PT intervention to resume normal activities.   Rehab potential is good.    Frequency:  1 X week, once daily  Duration:  for 6 weeks  Discharge Plan:  Achieve all LTG.  Independent in home treatment program.  Reach maximal therapeutic benefit.    Please refer to the daily flowsheet for treatment today, total treatment time and time spent performing 1:1 timed codes.

## 2018-08-01 NOTE — LETTER
DEPARTMENT OF HEALTH AND HUMAN SERVICES  CENTERS FOR MEDICARE & MEDICAID SERVICES    PLAN/UPDATED PLAN OF PROGRESS FOR OUTPATIENT REHABILITATION    PATIENTS NAME:  Abril Aguilar   : 1935  PR[facility]BER:    [id number]  [id number]  PROVIDER NAME: [facility]  MEDICAL RECORD NUMBER: [id number]   START OF CARE DATE:  SOC Date: 18   TYPE:  PT  PRIMARY/TREATMENT DIAGNOSIS: (Pertinent Medical Diagnosis)  Neck pain    VISITS FROM START OF CARE:  Rxs Used: 1     [facility] Initial Evaluation  Subjective:  Patient is a 82 year old female presenting with rehab cervical spine hpi. The history is provided by the patient. No  was used.   Abril Aguilar is a 82 year old female with a cervical spine condition.  Condition occurred with:  Insidious onset.  Condition occurred: for unknown reasons.  This is a new condition  Patient reports a gradual onset of right thoracic pain beginning about 2-3 weeks ago.  Patient attributes onset of neck pain to sleeping wrong.  Patient c/o difficulty sleeping comfortably and sitting comfortably. Physical therapy was ordered 2018.    Patient reports pain:  Thoracic right side and cervical right side.    Pain is described as aching and is constant and reported as 5/10.  Associated symptoms:  Loss of motion/stiffness (patient denies radicular symptoms). Pain is the same all the time.  Symptoms are exacerbated by sitting and lying down and relieved by heat, ice, NSAID's and other (standing).  Since onset symptoms are gradually worsening.  Special testing: none.  Previous treatment: none.    General health as reported by patient is excellent.  Pertinent medical history includes:  Asthma.  Medical allergies: no.  Other surgeries include:  Other.  Current medications:  Anti-inflammatory.  Current occupation is Retired.        Barriers include:  None as reported by the patient.    Red flags:  None as reported by the  patient.       Objective:  Standing Alignment:    Cervical/Thoracic:  Forward head and thoracic kyphosis increased       Cervical/Thoracic Evaluation    AROM:  AROM Cervical:    Flexion:            WNL  Extension:       Min Loss  Rotation:         Left: Min Loss     Right: Min Loss  Side Bend:      Left: Mod Loss     Right:  Mod Loss  PATIENTS NAME:  Abril Aguilar   : 1935    AROM Thoracic:    Flexion:             WNL  Extension:          Min Loss  Rotation:            Left:     Right:    Strength: Poor deep neck flexor activation  Headaches: none  Cervical Myotomes:        C4 (shrug):  Left: 5    Right: 5  C5 (Deltoid):  Left: 5    Right: 5  C6 (Biceps):  Left: 5    Right: 5  C7 (Triceps):  Left: 5    Right: 5  C8 (Thumb Ext): Left: 5    Right: 5  T1 (Intrinsics): Left: 5    Right: 5    Cervical Palpation:  : R costovertebral joint ~T4 pain with palpation and decreased mobility.    Tenderness present at Right:    Rhomboids and Upper Trap  Tenderness not present at Right:      Levator; Erector Spinae or Suboccipitals      Spinal Segmental Conclusions:  Multi-segmental hypomobility    Assessment/Plan:    Patient is a 82 year old female with cervical complaints.    Patient has the following significant findings with corresponding treatment plan.                Diagnosis 1:  Cervical vs Thoracic Derangement  Pain -  manual therapy, self management, education, directional preference exercise and home program  Decreased ROM/flexibility - manual therapy, therapeutic exercise, therapeutic activity and home program  Decreased joint mobility - manual therapy, therapeutic exercise, therapeutic activity and home program  Impaired muscle performance - neuro re-education and home program  Decreased function - therapeutic activities and home program  Impaired posture - neuro re-education, therapeutic activities and home program    Therapy Evaluation Codes:   1) History comprised of:   Personal factors that impact the  plan of care:      None.    Comorbidity factors that impact the plan of care are:      None.     Medications impacting care: Anti-inflammatory.  2) Examination of Body Systems comprised of:   Body structures and functions that impact the plan of care:      Cervical spine and Thoracic Spine.   Activity limitations that impact the plan of care are:    PATIENTS NAME:  Abril Aguilar   : 1935     Sitting and Sleeping.  3) Clinical presentation characteristics are:   Stable/Uncomplicated.  4) Decision-Making    Low complexity using standardized patient assessment instrument and/or measureable assessment of functional outcome.  Cumulative Therapy Evaluation is: Low complexity.    Previous and current functional limitations:  (See Goal Flow Sheet for this information)    Short term and Long term goals: (See Goal Flow Sheet for this information)     Communication ability:  Patient appears to be able to clearly communicate and understand verbal and written communication and follow directions correctly.  Treatment Explanation - The following has been discussed with the patient:   RX ordered/plan of care  Anticipated outcomes  Possible risks and side effects  This patient would benefit from PT intervention to resume normal activities.   Rehab potential is good.    Frequency:  1 X week, once daily  Duration:  for 6 weeks  Discharge Plan:  Achieve all LTG.  Independent in home treatment program.  Reach maximal therapeutic benefit.      Caregiver Signature/Credentials _____________________________ Date ________       Treating Provider: Raffy Garcia PT   I have reviewed and certified the need for these services and plan of treatment while under my care.        PHYSICIAN'S SIGNATURE:   _____________________________________  Date___________                                  Julio Cesar Carrero MD    Certification period:  Beginning of Cert date period: 18 to  End of Cert period date: 10/29/18     Functional Level Progress  "Report: Please see attached \"Goal Flow sheet for Functional level.\"    ____X____ Continue Services or       ________ DC Services                Service dates: From  SOC Date: 08/01/18 date to present                         "

## 2018-08-08 ENCOUNTER — THERAPY VISIT (OUTPATIENT)
Dept: PHYSICAL THERAPY | Facility: CLINIC | Age: 83
End: 2018-08-08
Payer: MEDICARE

## 2018-08-08 DIAGNOSIS — M54.2 NECK PAIN: ICD-10-CM

## 2018-08-08 PROCEDURE — 97110 THERAPEUTIC EXERCISES: CPT | Mod: GP | Performed by: PHYSICAL THERAPY ASSISTANT

## 2018-08-08 PROCEDURE — 97140 MANUAL THERAPY 1/> REGIONS: CPT | Mod: GP | Performed by: PHYSICAL THERAPY ASSISTANT

## 2018-08-10 ENCOUNTER — TRANSFERRED RECORDS (OUTPATIENT)
Dept: HEALTH INFORMATION MANAGEMENT | Facility: CLINIC | Age: 83
End: 2018-08-10

## 2018-08-20 ENCOUNTER — THERAPY VISIT (OUTPATIENT)
Dept: PHYSICAL THERAPY | Facility: CLINIC | Age: 83
End: 2018-08-20
Payer: MEDICARE

## 2018-08-20 DIAGNOSIS — M54.2 NECK PAIN: ICD-10-CM

## 2018-08-20 PROCEDURE — 97110 THERAPEUTIC EXERCISES: CPT | Mod: GP | Performed by: PHYSICAL THERAPY ASSISTANT

## 2018-08-20 PROCEDURE — 97140 MANUAL THERAPY 1/> REGIONS: CPT | Mod: GP | Performed by: PHYSICAL THERAPY ASSISTANT

## 2018-08-20 NOTE — MR AVS SNAPSHOT
After Visit Summary   8/20/2018    Abril Aguilar    MRN: [id number]           Patient Information     Date Of Birth          1935        Visit Information        Provider Department      8/20/2018 3:10 PM Amy Castellanos PTA [facility]        Today's Diagnoses     Neck pain           Follow-ups after your visit        Who to contact     If you have questions or need follow up information about today's clinic visit or your schedule please contact [facility] directly at [phone].  Normal or non-critical lab and imaging results will be communicated to you by MyChart, letter or phone within 4 business days after the clinic has received the results. If you do not hear from us within 7 days, please contact the clinic through MyChart or phone. If you have a critical or abnormal lab result, we will notify you by phone as soon as possible.  Submit refill requests through [employer] or call your pharmacy and they will forward the refill request to us. Please allow 3 business days for your refill to be completed.          Additional Information About Your Visit        Care EveryWhere ID     This is your Care EveryWhere ID. This could be used by other organizations to access your [place] medical records  [id number]         Blood Pressure from Last 3 Encounters:   07/30/18 126/80   05/17/18 126/74   05/04/17 126/76    Weight from Last 3 Encounters:   07/30/18 68 kg (150 lb)   05/17/18 66.7 kg (147 lb)   05/04/17 68.9 kg (152 lb)              We Performed the Following     Manual Ther Tech, 1+Regions, EA 15 min     Therapeutic Exercises        Primary Care Provider Office Phone # Fax #    Julio Cesar Carrero [phone]       [address] [facility] [address]        Equal Access to Services     E[facility]ENIE [facility] Hospital[facility]LL[facility] : Hadii ney [place], waimanda mike, qaybta nancy light. So [facility] [phone].    ATENCIÓN: Madhu weir  español, tiene a elkins disposición servicios gratuitos de asistencia lingüística. Stevie rivas [phone].    We comply with applicable federal civil rights laws and [place] laws. We do not discriminate on the basis of race, color, national origin, age, disability, sex, sexual orientation, or gender identity.            Thank you!     Thank you for choosing [facility]  for your care. Our goal is always to provide you with excellent care. Hearing back from our patients is one way we can continue to improve our services. Please take a few minutes to complete the written survey that you may receive in the mail after your visit with us. Thank you!             Your Updated Medication List - Protect others around you: Learn how to safely use, store and throw away your medicines at www.disposemymeds.org.          This list is accurate as of 8/20/18 11:59 PM.  Always use your most recent med list.                   Brand Name Dispense Instructions for use Diagnosis    aspirin 81 MG tablet     100    ONE DAILY        fluticasone-vilanterol 100-25 MCG/INH inhaler    BREO ELLIPTA     Inhale 1 puff into the lungs daily        multivitamin Tabs tablet      1 daily        vitamin D 2000 units tablet     100 tablet    Take 2,000 Units by mouth daily    Hyponatremia, Hepatitis, Vitamin D deficiency

## 2018-08-22 NOTE — PROGRESS NOTES
Subjective:  HPI                    Objective:  System    Physical Exam    General     ROS    Assessment/Plan:    DISCHARGE REPORT    Progress reporting period is from 8/1/18 to 8/20/18.      SUBJECTIVE  Subjective changes noted by patient:  Patient reports that she is feeling really good and feels that she can be independent with her treatment.  She is very active and that the pain in the right shoulder is very manageable.      Current pain level is .  Current Pain level: 1/10    Previous pain level was:       Changes in function:  Yes (See Goal flowsheet attached for changes in current functional level)     Adverse reaction to treatment or activity: None     OBJECTIVE  Changes noted in objective findings:  Yes, Patient is able to perform all of her right shoulder ROM WNL.  MMT of the right shoulder is 5-/5 in all planes.  Tightness in the posterior aspect of the right shoulder with stretches and strengthening to this area.  Spadi score is 3.  NDI score is a 4.  Cervical ROM with extension at 75%, right rotation at 75%, left rotation at 75%, flexion at 100%. Issued home extension for the cervical to gain the ROM with less pain in the cervical area.

## 2018-08-23 PROBLEM — M54.2 NECK PAIN: Status: RESOLVED | Noted: 2018-08-01 | Resolved: 2018-08-23

## 2018-08-23 NOTE — PROGRESS NOTES
Subjective:  HPI                    Objective:  System    Physical Exam    General     ROS    Assessment/Plan:    ASSESSMENT/PLAN  Updated problem list and treatment plan: Diagnosis 1:  Neck Pain  Pain -  home program  Decreased ROM/flexibility - home program  Decreased strength - home program  Impaired muscle performance - home program  Decreased function - home program  Impaired posture - home program  STG/LTGs have been met:  Yes (See Goal flow sheet completed today.)  Progress toward STG/LTGs have been made:  Yes (See Goal flow sheet completed today.)  Assessment of Progress: The patient's condition is improving.  Patient is meeting short term goals and is progressing towards long term goals.  Self Management Plans:  Patient has been instructed in a home treatment program.  Patient is independent in a home treatment program.  I have re-evaluated this patient and find that the nature, scope, duration and intensity of the therapy is appropriate for the medical condition of the patient.  Abril continues to require the following intervention to meet STG and LT's:  PT intervention is no longer required to meet STG/LTG.    Recommendations:  This patient is ready to be discharged from therapy and continue their home treatment program.    Please refer to the daily flowsheet for treatment today, total treatment time and time spent performing 1:1 timed codes.

## 2019-05-20 ENCOUNTER — OFFICE VISIT (OUTPATIENT)
Dept: INTERNAL MEDICINE | Facility: CLINIC | Age: 84
End: 2019-05-20
Payer: MEDICARE

## 2019-05-20 VITALS
TEMPERATURE: 98.8 F | HEART RATE: 75 BPM | DIASTOLIC BLOOD PRESSURE: 78 MMHG | HEIGHT: 62 IN | BODY MASS INDEX: 27.6 KG/M2 | SYSTOLIC BLOOD PRESSURE: 124 MMHG | OXYGEN SATURATION: 95 % | WEIGHT: 150 LBS | RESPIRATION RATE: 16 BRPM

## 2019-05-20 DIAGNOSIS — Z00.00 MEDICARE ANNUAL WELLNESS VISIT, SUBSEQUENT: Primary | ICD-10-CM

## 2019-05-20 DIAGNOSIS — Z13.1 SCREENING FOR DIABETES MELLITUS: ICD-10-CM

## 2019-05-20 PROCEDURE — G0439 PPPS, SUBSEQ VISIT: HCPCS | Performed by: INTERNAL MEDICINE

## 2019-05-20 ASSESSMENT — ASTHMA QUESTIONNAIRES
QUESTION_1 LAST FOUR WEEKS HOW MUCH OF THE TIME DID YOUR ASTHMA KEEP YOU FROM GETTING AS MUCH DONE AT WORK, SCHOOL OR AT HOME: NONE OF THE TIME
QUESTION_5 LAST FOUR WEEKS HOW WOULD YOU RATE YOUR ASTHMA CONTROL: WELL CONTROLLED
ACT_TOTALSCORE: 24
ACUTE_EXACERBATION_TODAY: NO
QUESTION_4 LAST FOUR WEEKS HOW OFTEN HAVE YOU USED YOUR RESCUE INHALER OR NEBULIZER MEDICATION (SUCH AS ALBUTEROL): NOT AT ALL
QUESTION_3 LAST FOUR WEEKS HOW OFTEN DID YOUR ASTHMA SYMPTOMS (WHEEZING, COUGHING, SHORTNESS OF BREATH, CHEST TIGHTNESS OR PAIN) WAKE YOU UP AT NIGHT OR EARLIER THAN USUAL IN THE MORNING: NOT AT ALL
QUESTION_2 LAST FOUR WEEKS HOW OFTEN HAVE YOU HAD SHORTNESS OF BREATH: NOT AT ALL

## 2019-05-20 ASSESSMENT — ACTIVITIES OF DAILY LIVING (ADL): CURRENT_FUNCTION: NO ASSISTANCE NEEDED

## 2019-05-20 ASSESSMENT — MIFFLIN-ST. JEOR: SCORE: 1098.65

## 2019-05-20 NOTE — PROGRESS NOTES
"SUBJECTIVE:   Abril Aguilar is a 83 year old female who presents for Preventive Visit.  Are you in the first 12 months of your Medicare coverage?  No    Healthy Habits:     In general, how would you rate your overall health?  Excellent    Frequency of exercise:  2-3 days/week    Duration of exercise:  Less than 15 minutes    Do you usually eat at least 4 servings of fruit and vegetables a day, include whole grains    & fiber and avoid regularly eating high fat or \"junk\" foods?  Yes    Taking medications regularly:  Yes    Medication side effects:  Not applicable    Ability to successfully perform activities of daily living:  No assistance needed    Home Safety:  Lack of grab bars in the bathroom and no safety concerns identified    Hearing Impairment:  No hearing concerns    In the past 6 months, have you been bothered by leaking of urine? Yes    In general, how would you rate your overall mental or emotional health?  Excellent      PHQ-2 Total Score: 0    Additional concerns today:  No    Do you feel safe in your environment? Yes    Do you have a Health Care Directive? No: Advance care planning reviewed with patient; information given to patient to review.      Fall risk  Fallen 2 or more times in the past year?: No  Any fall with injury in the past year?: No    Cognitive Screening   1) Repeat 3 items (Leader, Season, Table)    2) Clock draw: NORMAL  3) 3 item recall: Recalls 2 objects   Results: NORMAL clock, 1-2 items recalled: COGNITIVE IMPAIRMENT LESS LIKELY    Mini-CogTM Copyright S Melany. Licensed by the author for use in [facility]; reprinted with permission (nitza@.[facility]). All rights reserved.      Do you have sleep apnea, excessive snoring or daytime drowsiness?: no    Reviewed and updated as needed this visit by clinical staff  Tobacco  Allergies  Meds         Reviewed and updated as needed this visit by Provider        Social History     Tobacco Use     Smoking status: Never Smoker "     Smokeless tobacco: Never Used   Substance Use Topics     Alcohol use: No         Alcohol Use 5/20/2019   Prescreen: >3 drinks/day or >7 drinks/week? No   Prescreen: >3 drinks/day or >7 drinks/week? -           Current providers sharing in care for this patient include:   Patient Care Team:  Julio Cesar Carrero MD as PCP - General (Internal Medicine)  Julio Cesar Carrero MD as Assigned PCP    The following health maintenance items are reviewed in Epic and correct as of today:  Health Maintenance   Topic Date Due     ZOSTER IMMUNIZATION (2 of 3) 08/24/2009     DEXA  05/27/2017     ADVANCED DIRECTIVE PLANNING  10/09/2017     ASTHMA CONTROL TEST  11/17/2018     PHQ-2  01/01/2019     FALL RISK ASSESSMENT  05/17/2019     MEDICARE ANNUAL WELLNESS VISIT  05/17/2019     INFLUENZA VACCINE (Season Ended) 09/01/2019     DTAP/TDAP/TD IMMUNIZATION (3 - Td) 05/27/2025     ASTHMA ACTION PLAN  Completed     PNEUMOCOCCAL IMMUNIZATION 65+ LOW/MEDIUM RISK  Completed     IPV IMMUNIZATION  Aged Out     MENINGITIS IMMUNIZATION  Aged Out           Review of Systems  CONSTITUTIONAL: NEGATIVE for fever, chills, change in weight  INTEGUMENTARY/SKIN: NEGATIVE for worrisome rashes, moles or lesions  EYES: NEGATIVE for  Irritation. Eye appt every 8 weeks for macular degeneration  ENT/MOUTH: NEGATIVE for ear, mouth and throat problems  RESP: NEGATIVE for significant cough or SOB with Advair. Followed by Pulmonary  BREAST: NEGATIVE for masses, tenderness or discharge. Declines mammogram  CV: NEGATIVE for chest pain, palpitations or peripheral edema  GI: NEGATIVE for nausea, abdominal pain, heartburn, or change in bowel habits  : NEGATIVE for frequency, dysuria, or hematuria. NO vaginal sx  MUSCULOSKELETAL: NEGATIVE for significant arthralgias or myalgia that limit activity  NEURO: NEGATIVE for weakness, dizziness or paresthesias  ENDOCRINE: NEGATIVE for temperature intolerance, skin/hair changes. Declines DEXA. Mild osteopenia in past  HEME: NEGATIVE  "for bleeding problems  PSYCHIATRIC: NEGATIVE for changes in mood or affect    OBJECTIVE:   /78   Pulse 75   Temp 98.8  F (37.1  C) (Oral)   Resp 16   Ht 1.575 m (5' 2\")   Wt 68 kg (150 lb)   SpO2 95%   BMI 27.44 kg/m   Estimated body mass index is 27.44 kg/m  as calculated from the following:    Height as of this encounter: 1.575 m (5' 2\").    Weight as of this encounter: 68 kg (150 lb).  Physical Exam  General appearance - healthy, alert, no distress  Skin - No rashes. Few seborrheic keratosis on back  Head - normocephalic, atraumatic  Eyes - KURT, EOMI, fundi exam with nondilated pupils negative.  Ears - External ears normal. Canals  With minimal cerumen left that as cleaned with ring tip probe without complication TM's normal.  Nose/Sinuses - Nares normal. Septum midline. Mucosa normal. No drainage or sinus tenderness.  Oropharynx - No erythema, no adenopathy, no exudates.  Neck - Supple without adenopathy or thyromegaly. No bruits.  Lungs - Clear to auscultation without wheezes/rhonchi.  Heart - Regular rate and rhythm without murmurs, clicks, or gallops.  Nodes - No supraclavicular, axillary, or inguinal adenopathy palpable.  Breasts - No masses or tenderness palpable bilaterally. No nipple discharge to palpation  Abdomen - Abdomen soft, non-tender. BS normal. No masses or hepatosplenomegaly palpable. No bruits.  Extremities -No cyanosis, clubbing or edema.    Musculoskeletal - Spine ROM normal. Muscular strength intact.  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 Reflexes normal and symmetric. Sensation grossly WNL.  Genital/rectal - deferred          ASSESSMENT / PLAN:   1. Medicare annual wellness visit, subsequent   Pt declines HCM. See other orders below    2. Screening for diabetes mellitus  - Glucose; Future      End of Life Planning:  Patient currently has " "an advanced directive: No.  I have verified the patient's ablity to prepare an advanced directive/make health care decisions.  Literature was provided to assist patient in preparing an advanced directive.    COUNSELING:  Reviewed preventive health counseling, as reflected in patient instructions    Estimated body mass index is 27.44 kg/m  as calculated from the following:    Height as of this encounter: 1.575 m (5' 2\").    Weight as of this encounter: 68 kg (150 lb).         reports that she has never smoked. She has never used smokeless tobacco.      Appropriate preventive services were discussed with this patient, including applicable screening as appropriate for cardiovascular disease, diabetes, osteopenia/osteoporosis, and glaucoma.  As appropriate for age/gender, discussed screening for colorectal cancer, prostate cancer, breast cancer, and cervical cancer. Checklist reviewing preventive services available has been given to the patient.    Reviewed patients plan of care and provided an AVS. The Basic Care Plan (routine screening as documented in Health Maintenance) for Abril meets the Care Plan requirement. This Care Plan has been established and reviewed with the Patient.    Counseling Resources:  ATP IV Guidelines  Pooled Cohorts Equation Calculator  Breast Cancer Risk Calculator  FRAX Risk Assessment  ICSI Preventive Guidelines  Dietary Guidelines for Americans, 2010  USDA's MyPlate  ASA Prophylaxis  Lung CA Screening      PLAN:  Labs today as ordered   Increase  frequency of walking or other aerobic exercise as able (goal is daily)   Advanced care directive. Complete and bring back to clinic   Check with insurance or speak with your pharmacist re: Shingrix vaccine coverage for shingles prevention.  This is a 2 shot series done 2-6 months apart   Stop use of daily aspirin for prevention (harm > benefit)  Pt declines mammogram, DEXA  Follow-up with eye and lung doctors per their previous recommendations    "     Julio Cesar Carrero MD  [facility]

## 2019-05-20 NOTE — PATIENT INSTRUCTIONS
Labs today as ordered   Increase  frequency of walking or other aerobic exercise as able (goal is daily)   Advanced care directive. Complete and bring back to clinic   Check with insurance or speak with your pharmacist re: Shingrix vaccine coverage for shingles prevention.  This is a 2 shot series done 2-6 months apart   Stop use of daily aspirin for prevention (harm > benefit)  Pt declines mammogram, DEXA  Follow-up with eye and lung doctors per their previous recommendations

## 2019-05-21 ASSESSMENT — ASTHMA QUESTIONNAIRES: ACT_TOTALSCORE: 24

## 2019-07-19 ENCOUNTER — DOCUMENTATION ONLY (OUTPATIENT)
Dept: OTHER | Facility: CLINIC | Age: 84
End: 2019-07-19

## 2019-08-01 ENCOUNTER — ANCILLARY PROCEDURE (OUTPATIENT)
Dept: GENERAL RADIOLOGY | Facility: CLINIC | Age: 84
End: 2019-08-01
Attending: FAMILY MEDICINE
Payer: MEDICARE

## 2019-08-01 ENCOUNTER — OFFICE VISIT (OUTPATIENT)
Dept: ORTHOPEDICS | Facility: CLINIC | Age: 84
End: 2019-08-01
Payer: MEDICARE

## 2019-08-01 VITALS
BODY MASS INDEX: 27.6 KG/M2 | DIASTOLIC BLOOD PRESSURE: 80 MMHG | HEIGHT: 62 IN | WEIGHT: 150 LBS | SYSTOLIC BLOOD PRESSURE: 138 MMHG

## 2019-08-01 DIAGNOSIS — M47.812 FACET ARTHROPATHY, CERVICAL: ICD-10-CM

## 2019-08-01 DIAGNOSIS — M54.2 CERVICALGIA: Primary | ICD-10-CM

## 2019-08-01 DIAGNOSIS — M50.30 DEGENERATIVE DISC DISEASE, CERVICAL: ICD-10-CM

## 2019-08-01 DIAGNOSIS — M54.2 CERVICALGIA: ICD-10-CM

## 2019-08-01 DIAGNOSIS — M54.12 RIGHT CERVICAL RADICULOPATHY: ICD-10-CM

## 2019-08-01 PROCEDURE — 72040 X-RAY EXAM NECK SPINE 2-3 VW: CPT

## 2019-08-01 PROCEDURE — 99214 OFFICE O/P EST MOD 30 MIN: CPT | Performed by: FAMILY MEDICINE

## 2019-08-01 ASSESSMENT — MIFFLIN-ST. JEOR: SCORE: 1098.65

## 2019-08-01 NOTE — PROGRESS NOTES
ASSESSMENT & PLAN    1. Cervicalgia    2. Degenerative disc disease, cervical    3. Right cervical radiculopathy    4. Facet arthropathy, cervical      Seen for acute / chronic neck pain  Only do the chin tucks and towel stretch 2 x / day until evaluated by therapy / Amy  [facility] Physical therapy: [facility] - [phone] for 2-3 sessions to see if there is any improvement  MRI of your neck has been ordered. Schedule with Raji ([phone]).     Once you know the date of your MRI, please call my office and schedule a follow-up visit for 2 days after that.    -----    SUBJECTIVE  Abril Aguilar is a/an 83 year old Right handed female who is seen as a self referral for evaluation for acute/chronic right neck pain. The patient is seen by themselves.    Onset: 10 day(s) ago. Reports insidious onset without acute precipitating event.  Location of Pain: base of the neck with radiation into the right scapular area   Rating of Pain at worst: 8/10  Rating of Pain Currently: 6/10  Worsened by: soreness with movement of the neck, has had a couple episodes of sudden sharp pain with reaching and pulling with right arm  Better with: Ibuprofen   Treatments tried: ice, heat, ibuprofen, home exercises from previous physical therapy  Quality: burning  Associated symptoms:  burning in right scapular area, denies any numbness/tingling or weakness of the upper extremities  Orthopedic history: similar neck pain in 2018 which resolved with physical therapy  Relevant surgical history: NO  Patient Social History: retired    Patient's past medical, surgical, social, and family histories were reviewed today and no changes are noted.    REVIEW OF SYSTEMS:  10 point ROS is negative other than symptoms noted above in HPI, Past Medical History or as stated below  Constitutional: NEGATIVE for fever, chills, change in weight  Skin: NEGATIVE for worrisome rashes, moles or lesions  GI/: NEGATIVE for bowel or bladder  "changes  Neuro: NEGATIVE for weakness, dizziness or paresthesias    OBJECTIVE:  /80   Ht 1.575 m (5' 2\")   Wt 68 kg (150 lb)   BMI 27.44 kg/m     General: healthy, alert and in no distress  HEENT: no scleral icterus or conjunctival erythema  Skin: no suspicious lesions or rash. No jaundice.  CV: regular rhythm by palpation  Resp: normal respiratory effort without conversational dyspnea   Psych: normal mood and affect  Gait: normal steady gait with appropriate coordination and balance  Neuro: normal light touch sensory exam of the bilateral upper extremities.    MSK:  CERVICAL SPINE  Inspection:    normal cervical lordosis present, rounded shoulders, forward head posture  Palpation:    Tender about the upper trapezius (right), rhomboids (right), medial border of scapula and superior angle of scapula. Otherwise remainder of the landmarks and nontender.  Range of Motion:     Flexion full    Extension limited by pain    Right and left side bend limited by pain    Right and left rotation limited by pain  Strength:    Deltoid (C5) 5/5, biceps (C6) 5/5, wrist extension (C6) 5/5, triceps (C7) 5/5, wrist flexion (C7) 5/5, finger flexion (C8) 5/5    Independent visualization of the below image:  Recent Results (from the past 24 hour(s))   XR Cervical Spine 2/3 Views    Narrative    CERVICAL SPINE 2/3 VIEWS 8/1/2019 10:44 AM     COMPARISON: None    HISTORY: Cervicalgia.      Impression    IMPRESSION: There is normal alignment of the cervical vertebrae.  Vertebral body heights of the cervical spine are normal.  Craniocervical alignment is normal. There are no fractures of the  cervical spine. There is degenerative endplate spurring at the C5-C6  level. There is mild-moderate facet arthropathy throughout the  cervical spine.     Autumn Noguera,  [facility]      "

## 2019-08-01 NOTE — LETTER
8/1/2019         RE: Abril Aguilar  [address]  [place] [facility] [place] [address]        Dear Colleague,    Thank you for referring your patient, Abril Aguilar, to the [facility]. Please see a copy of my visit note below.    ASSESSMENT & PLAN    1. Cervicalgia    2. Degenerative disc disease, cervical    3. Right cervical radiculopathy    4. Facet arthropathy, cervical      Seen for acute / chronic neck pain  Only do the chin tucks and towel stretch 2 x / day until evaluated by therapy / Amy Balderrama Physical therapy: [facility] - [phone] for 2-3 sessions to see if there is any improvement  MRI of your neck has been ordered. Schedule with Raji ([phone]).     Once you know the date of your MRI, please call my office and schedule a follow-up visit for 2 days after that.    -----    SUBJECTIVE  Abril Aguilar is a/an 83 year old Right handed female who is seen as a self referral for evaluation for acute/chronic right neck pain. The patient is seen by themselves.    Onset: 10 day(s) ago. Reports insidious onset without acute precipitating event.  Location of Pain: base of the neck with radiation into the right scapular area   Rating of Pain at worst: 8/10  Rating of Pain Currently: 6/10  Worsened by: soreness with movement of the neck, has had a couple episodes of sudden sharp pain with reaching and pulling with right arm  Better with: Ibuprofen   Treatments tried: ice, heat, ibuprofen, home exercises from previous physical therapy  Quality: burning  Associated symptoms:  burning in right scapular area, denies any numbness/tingling or weakness of the upper extremities  Orthopedic history: similar neck pain in 2018 which resolved with physical therapy  Relevant surgical history: NO  Patient Social History: retired    Patient's past medical, surgical, social, and family histories were reviewed today and no changes are noted.    REVIEW OF SYSTEMS:  10  "point ROS is negative other than symptoms noted above in HPI, Past Medical History or as stated below  Constitutional: NEGATIVE for fever, chills, change in weight  Skin: NEGATIVE for worrisome rashes, moles or lesions  GI/: NEGATIVE for bowel or bladder changes  Neuro: NEGATIVE for weakness, dizziness or paresthesias    OBJECTIVE:  /80   Ht 1.575 m (5' 2\")   Wt 68 kg (150 lb)   BMI 27.44 kg/m      General: healthy, alert and in no distress  HEENT: no scleral icterus or conjunctival erythema  Skin: no suspicious lesions or rash. No jaundice.  CV: regular rhythm by palpation  Resp: normal respiratory effort without conversational dyspnea   Psych: normal mood and affect  Gait: normal steady gait with appropriate coordination and balance  Neuro: normal light touch sensory exam of the bilateral upper extremities.    MSK:  CERVICAL SPINE  Inspection:    normal cervical lordosis present, rounded shoulders, forward head posture  Palpation:    Tender about the upper trapezius (right), rhomboids (right), medial border of scapula and superior angle of scapula. Otherwise remainder of the landmarks and nontender.  Range of Motion:     Flexion full    Extension limited by pain    Right and left side bend limited by pain    Right and left rotation limited by pain  Strength:    Deltoid (C5) 5/5, biceps (C6) 5/5, wrist extension (C6) 5/5, triceps (C7) 5/5, wrist flexion (C7) 5/5, finger flexion (C8) 5/5    Independent visualization of the below image:  Recent Results (from the past 24 hour(s))   XR Cervical Spine 2/3 Views    Narrative    CERVICAL SPINE 2/3 VIEWS 8/1/2019 10:44 AM     COMPARISON: None    HISTORY: Cervicalgia.      Impression    IMPRESSION: There is normal alignment of the cervical vertebrae.  Vertebral body heights of the cervical spine are normal.  Craniocervical alignment is normal. There are no fractures of the  cervical spine. There is degenerative endplate spurring at the C5-C6  level. There is " mild-moderate facet arthropathy throughout the  cervical spine.     Autumn Noguera DO [facility]        Again, thank you for allowing me to participate in the care of your patient.        Sincerely,        Autumn Noguera DO

## 2019-08-01 NOTE — PATIENT INSTRUCTIONS
1. Cervicalgia    2. Degenerative disc disease, cervical    3. Right cervical radiculopathy    4. Facet arthropathy, cervical      Only do the chin tucks and towel stretch 2 x / day until evaluated by therapy / Amy Balderrama Physical therapy: [facility] - [phone] for 2-3 sessions to see if there is any improvement  MRI of your neck has been ordered. Schedule with Raji ([phone]).     Once you know the date of your MRI, please call my office and schedule a follow-up visit for 2 days after that.

## 2019-08-02 ENCOUNTER — TELEPHONE (OUTPATIENT)
Dept: ORTHOPEDICS | Facility: CLINIC | Age: 84
End: 2019-08-02

## 2019-08-02 DIAGNOSIS — M50.30 DEGENERATIVE DISC DISEASE, CERVICAL: ICD-10-CM

## 2019-08-02 DIAGNOSIS — M47.812 FACET ARTHROPATHY, CERVICAL: ICD-10-CM

## 2019-08-02 DIAGNOSIS — M54.2 CERVICALGIA: Primary | ICD-10-CM

## 2019-08-02 DIAGNOSIS — M54.12 RIGHT CERVICAL RADICULOPATHY: ICD-10-CM

## 2019-08-02 NOTE — TELEPHONE ENCOUNTER
Spoke with Dr. Noguera.  States okay to proceed with chiropractic care but would advise against any cervical manipulations.  Okay to try soft tissue and acupuncture for present symptoms.    Return call to patient with Dr. Noguera's recommendations.  She asked that in order replaced for chiropractic services with the [facility].    Order placed    Larry Decker ATC

## 2019-08-02 NOTE — TELEPHONE ENCOUNTER
Abril Aguilar is a 83 year old female who calls with a question regarding chiropractic care.    She is wondering if she could pursue chiropractic care prior to obtaining the MRI.     Treatment plan from OV on 8/1/2019 recommended:  -Seen for acute / chronic neck pain  -Only do the chin tucks and towel stretch 2 x / day until evaluated by therapy / Amy  -Restart Physical therapy: [facility] - [phone] for 2-3 sessions to see if there is any improvement  -MRI of your neck has been ordered. Schedule with Raji ([phone]).     Next OV scheduled? No      Patient expecting call back: YES      Preferred contact number:  [phone] (home)      Can we leave a detailed message on this number: NO

## 2019-08-05 ENCOUNTER — THERAPY VISIT (OUTPATIENT)
Dept: PHYSICAL THERAPY | Facility: CLINIC | Age: 84
End: 2019-08-05
Attending: FAMILY MEDICINE
Payer: MEDICARE

## 2019-08-05 DIAGNOSIS — M54.12 RIGHT CERVICAL RADICULOPATHY: ICD-10-CM

## 2019-08-05 DIAGNOSIS — M47.812 FACET ARTHROPATHY, CERVICAL: ICD-10-CM

## 2019-08-05 DIAGNOSIS — M50.30 DEGENERATIVE DISC DISEASE, CERVICAL: ICD-10-CM

## 2019-08-05 DIAGNOSIS — M54.2 CERVICALGIA: ICD-10-CM

## 2019-08-05 PROCEDURE — 97161 PT EVAL LOW COMPLEX 20 MIN: CPT | Mod: GP | Performed by: PHYSICAL THERAPIST

## 2019-08-05 PROCEDURE — 97110 THERAPEUTIC EXERCISES: CPT | Mod: GP | Performed by: PHYSICAL THERAPIST

## 2019-08-05 NOTE — PROGRESS NOTES
[facility] Initial Evaluation  Abril Aguilar is a 83 year old  female referred to physical therapy by Dr. Noguera for treatment of neck pain with Precautions/Restrictions/MD instructions eval and treat     Physical Therapy Initial Evaluation: Subjective History      Injury/Condition Details:  Presenting Complaint Neck pain   Onset Timing/Date July 2019   Mechanism Insidious onset without acute precipitating event, however, believes sleeping on a substandard pillow may have contributed to her pain      Symptom Behavior Details    Primary Pain Symptoms Location: Midline neck pain  Quality: ache sometimes sharp   Frequency: Constant   Worst Pain 7/10   Best Pain 3/10   Symptom Provocators Sleeping, lifting, driving   Symptom Relievers Rest, inactivity, changing positions   Time of day dependent? Worse during the day   Recent symptom change? None      Prior Testing/Intervention for current condition:  Prior Tests X-ray of cervical spine indicating:  IMPRESSION: There is normal alignment of the cervical vertebrae.  Vertebral body heights of the cervical spine are normal.  Craniocervical alignment is normal. There are no fractures of the  cervical spine. There is degenerative endplate spurring at the C5-C6 level. There is mild-moderate facet arthropathy throughout the cervical spine.   Prior Treatment None      Lifestyle & General Medical History:  General Health Reported by Patient excellent   Employment Retired   Orthopaedic history None   Notable medical history OA, pain at night/rest       HPI                    Objective:  Standing Alignment:    Cervical/Thoracic:  Forward head  Shoulder/UE:  Rounded shoulders                                  Cervical/Thoracic Evaluation    AROM:  AROM Cervical:    Flexion:            75%, moderate pain  Extension:       66%, mild pain  Rotation:         Left: 75%, painfree     Right: 75%, painfree  Side Bend:      Left: 50%, mild pain     Right:  50%, mild  pain      Headaches: none  Cervical Myotomes:  normal                        Cervical Palpation:    Tenderness present at Left:    Upper Trap and Levator  Tenderness present at Right:    Upper Trap and Levator      Spinal Segmental Conclusions:    Level:  Hypo at C4, C5, C6, C7 and T1             Shoulder Evaluation:  ROM:          Strength:    Flexion: Left:5/5   Pain:    Right: 5/5     Pain:     Abduction:  Left: 5/5  Pain:    Right: 5/5     Pain:    Internal Rotation:  Left:5/5     Pain:    Right: 5/5     Pain:  External Rotation:   Left:4/5     Pain:   Right:4/5     Pain:                                                     General     ROS    Assessment/Plan:    Patient is a 83 year old female with cervical complaints.    Patient has the following significant findings with corresponding treatment plan.                Diagnosis 1:  Neck pain  Pain -  manual therapy, splint/taping/bracing/orthotics, self management, education, directional preference exercise and home program  Decreased ROM/flexibility - manual therapy, therapeutic exercise, therapeutic activity and home program  Decreased joint mobility - manual therapy, therapeutic exercise, therapeutic activity and home program  Decreased strength - therapeutic exercise, therapeutic activities and home program  Decreased proprioception - neuro re-education, therapeutic activities and home program  Impaired muscle performance - neuro re-education and home program    Therapy Evaluation Codes:   1) History comprised of:   Personal factors that impact the plan of care:      None.    Comorbidity factors that impact the plan of care are:      Osteoarthritis and Pain at night/rest.     Medications impacting care: None.  2) Examination of Body Systems comprised of:   Body structures and functions that impact the plan of care:      Cervical spine.   Activity limitations that impact the plan of care are:      Driving, Lifting and Reading/Computer work.  3) Clinical  presentation characteristics are:   Stable/Uncomplicated.  4) Decision-Making    Low complexity using standardized patient assessment instrument and/or measureable assessment of functional outcome.  Cumulative Therapy Evaluation is: Low complexity.    Previous and current functional limitations:  (See Goal Flow Sheet for this information)    Short term and Long term goals: (See Goal Flow Sheet for this information)     Communication ability:  Patient appears to be able to clearly communicate and understand verbal and written communication and follow directions correctly.  Treatment Explanation - The following has been discussed with the patient:   RX ordered/plan of care  Anticipated outcomes  Possible risks and side effects  This patient would benefit from PT intervention to resume normal activities.   Rehab potential is good.    Frequency:  1 X week, once daily  Duration:  for 6 weeks  Discharge Plan:  Achieve all LTG.  Independent in home treatment program.  Reach maximal therapeutic benefit.    Please refer to the daily flowsheet for treatment today, total treatment time and time spent performing 1:1 timed codes.

## 2019-08-05 NOTE — LETTER
DEPARTMENT OF HEALTH AND HUMAN SERVICES  [facility]ICA[facility]    PLAN/UPDATED PLAN OF PROGRESS FOR OUTPATIENT REHABILITATION    PATIENTS NAME:  Abril Aguilar   : 1935  PROVIDER NUMBER:    [id number]  [id number]  PROVIDER NAME: [facility]  MEDICAL RECORD NUMBER: [id number]   START OF CARE DATE:  19   TYPE:  PT  PRIMARY/TREATMENT DIAGNOSIS: Cervicalgia  Degenerative disc disease, cervical  Right cervical radiculopathy  Facet arthropathy, cervical  VISITS FROM START OF CARE:  Rxs Used: 1     [facility] Initial Evaluation  Abril Aguilar is a 83 year old  female referred to physical therapy by Dr. Noguera for treatment of neck pain with Precautions/Restrictions/MD instructions eval and treat     Physical Therapy Initial Evaluation: Subjective History      Injury/Condition Details:  Presenting Complaint Neck pain   Onset Timing/Date 2019   Mechanism Insidious onset without acute precipitating event, however, believes sleeping on a substandard pillow may have contributed to her pain      Symptom Behavior Details    Primary Pain Symptoms Location: Midline neck pain  Quality: ache sometimes sharp   Frequency: Constant   Worst Pain 7/10   Best Pain 3/10   Symptom Provocators Sleeping, lifting, driving   Symptom Relievers Rest, inactivity, changing positions   Time of day dependent? Worse during the day   Recent symptom change? None      Prior Testing/Intervention for current condition:  Prior Tests X-ray of cervical spine indicating:  IMPRESSION: There is normal alignment of the cervical vertebrae.  Vertebral body heights of the cervical spine are normal.  Craniocervical alignment is normal. There are no fractures of the  cervical spine. There is degenerative endplate spurring at the C5-C6 level. There is mild-moderate facet arthropathy throughout the cervical spine.   Prior Treatment None      PATIENTS NAME:  Abril Aguilar   :  1935    Lifestyle & General Medical History:  General Health Reported by Patient excellent   Employment Retired   Orthopaedic history None   Notable medical history OA, pain at night/rest       Objective:  Standing Alignment:    Cervical/Thoracic:  Forward head  Shoulder/UE:  Rounded shoulders    Cervical/Thoracic Evaluation  AROM:  AROM Cervical:  Flexion:            75%, moderate pain  Extension:       66%, mild pain  Rotation:         Left: 75%, painfree     Right: 75%, painfree  Side Bend:      Left: 50%, mild pain     Right:  50%, mild pain  Headaches: none  Cervical Myotomes:  normal  Cervical Palpation:    Tenderness present at Left:    Upper Trap and Levator  Tenderness present at Right:    Upper Trap and Levator  Spinal Segmental Conclusions:    Level:  Hypo at C4, C5, C6, C7 and T1  Shoulder Evaluation:  ROM:  Strength:    Flexion: Left:5/5   Pain:    Right: 5/5     Pain:   Abduction:  Left: 5/5  Pain:    Right: 5/5     Pain:  Internal Rotation:  Left:5/5     Pain:    Right: 5/5     Pain:  External Rotation:   Left:4/5     Pain:   Right:4/5     Pain:      Assessment/Plan:    Patient is a 83 year old female with cervical complaints.    Patient has the following significant findings with corresponding treatment plan.                Diagnosis 1:  Neck pain  Pain -  manual therapy, splint/taping/bracing/orthotics, self management, education, directional preference exercise and home program  Decreased ROM/flexibility - manual therapy, therapeutic exercise, therapeutic activity and home program  Decreased joint mobility - manual therapy, therapeutic exercise, therapeutic activity and home program  Decreased strength - therapeutic exercise, therapeutic activities and home program  Decreased proprioception - neuro re-education, therapeutic activities and home program  Impaired muscle performance - neuro re-education and home program        PATIENTS NAME:  Abril Aguilar   : 1935    Therapy  Evaluation Codes:   1) History comprised of:   Personal factors that impact the plan of care:      None.    Comorbidity factors that impact the plan of care are:      Osteoarthritis and Pain at night/rest.     Medications impacting care: None.  2) Examination of Body Systems comprised of:   Body structures and functions that impact the plan of care:      Cervical spine.   Activity limitations that impact the plan of care are:      Driving, Lifting and Reading/Computer work.  3) Clinical presentation characteristics are:   Stable/Uncomplicated.  4) Decision-Making    Low complexity using standardized patient assessment instrument and/or measureable assessment of functional outcome.  Cumulative Therapy Evaluation is: Low complexity.    Previous and current functional limitations:  (See Goal Flow Sheet for this information)    Short term and Long term goals: (See Goal Flow Sheet for this information)   Communication ability:  Patient appears to be able to clearly communicate and understand verbal and written communication and follow directions correctly.  Treatment Explanation - The following has been discussed with the patient:   RX ordered/plan of care  Anticipated outcomes  Possible risks and side effects  This patient would benefit from PT intervention to resume normal activities.   Rehab potential is good.    Frequency:  1 X week, once daily  Duration:  for 6 weeks  Discharge Plan:  Achieve all LTG.  Independent in home treatment program.  Reach maximal therapeutic benefit.      Caregiver Signature/Credentials _____________________________ Date ________       Treating Provider: Dylan Perrin DPT, OCS     I have reviewed and certified the need for these services and plan of treatment while under my care.      PHYSICIAN'S SIGNATURE:   ___________________________________ Date___________     Autumn Noguera DO  Certification period:  Beginning of Cert date period: 08/05/19 to  End of Cert period date: 10/06/19  "  Functional Level Progress Report: Please see attached \"Goal Flow sheet for Functional level.\"  ____X____ Continue Services or       ________ DC Services              Service dates: From  SOC Date: 08/05/19 date to present                         "

## 2019-08-12 ENCOUNTER — TRANSFERRED RECORDS (OUTPATIENT)
Dept: HEALTH INFORMATION MANAGEMENT | Facility: CLINIC | Age: 84
End: 2019-08-12

## 2019-08-15 ENCOUNTER — THERAPY VISIT (OUTPATIENT)
Dept: PHYSICAL THERAPY | Facility: CLINIC | Age: 84
End: 2019-08-15
Payer: MEDICARE

## 2019-08-15 DIAGNOSIS — M54.2 CERVICALGIA: ICD-10-CM

## 2019-08-15 PROCEDURE — 97530 THERAPEUTIC ACTIVITIES: CPT | Mod: GP | Performed by: PHYSICAL THERAPY ASSISTANT

## 2019-08-15 PROCEDURE — 97110 THERAPEUTIC EXERCISES: CPT | Mod: GP | Performed by: PHYSICAL THERAPY ASSISTANT

## 2019-08-26 ENCOUNTER — THERAPY VISIT (OUTPATIENT)
Dept: PHYSICAL THERAPY | Facility: CLINIC | Age: 84
End: 2019-08-26
Payer: MEDICARE

## 2019-08-26 DIAGNOSIS — M54.2 CERVICALGIA: ICD-10-CM

## 2019-08-26 PROCEDURE — 97110 THERAPEUTIC EXERCISES: CPT | Mod: GP | Performed by: PHYSICAL THERAPY ASSISTANT

## 2019-08-26 PROCEDURE — 97530 THERAPEUTIC ACTIVITIES: CPT | Mod: GP | Performed by: PHYSICAL THERAPY ASSISTANT

## 2019-09-09 ENCOUNTER — THERAPY VISIT (OUTPATIENT)
Dept: PHYSICAL THERAPY | Facility: CLINIC | Age: 84
End: 2019-09-09
Payer: MEDICARE

## 2019-09-09 DIAGNOSIS — M54.2 CERVICALGIA: ICD-10-CM

## 2019-09-09 PROCEDURE — 97110 THERAPEUTIC EXERCISES: CPT | Mod: GP | Performed by: PHYSICAL THERAPY ASSISTANT

## 2019-09-09 PROCEDURE — 97530 THERAPEUTIC ACTIVITIES: CPT | Mod: GP | Performed by: PHYSICAL THERAPY ASSISTANT

## 2019-09-16 ENCOUNTER — THERAPY VISIT (OUTPATIENT)
Dept: PHYSICAL THERAPY | Facility: CLINIC | Age: 84
End: 2019-09-16
Payer: MEDICARE

## 2019-09-16 DIAGNOSIS — M54.2 CERVICALGIA: ICD-10-CM

## 2019-09-16 PROCEDURE — 97110 THERAPEUTIC EXERCISES: CPT | Mod: GP | Performed by: PHYSICAL THERAPY ASSISTANT

## 2019-09-16 PROCEDURE — 97112 NEUROMUSCULAR REEDUCATION: CPT | Mod: GP | Performed by: PHYSICAL THERAPY ASSISTANT

## 2019-09-16 NOTE — PROGRESS NOTES
Subjective:  HPI                    Objective:  System              Cervical/Thoracic Evaluation    AROM:  AROM Cervical:    Flexion:          100%  Extension:       50%  Rotation:         Left: 75%     Right: 75%  Side Bend:      Left:     Right:                                                                   General     ROS    Assessment/Plan:    DISCHARGE REPORT    Progress reporting period is from 8/5/19 to 9/16/19.      SUBJECTIVE  Subjective changes noted by patient: Patient reports that she is feeling really good.  She is sleeping better and she is able to turn her head and neck without difficulty and pain.  Patient states that she is shocked on how good she feels..      Current pain level is  0/10    Previous pain level was:   Initial Pain level: 3/10   Changes in function:  Yes (See Goal flowsheet attached for changes in current functional level)     Adverse reaction to treatment or activity: None     OBJECTIVE  Changes noted in objective findings:  Yes, patient has a good home exercise program and discussed continuation of the exercises 1-2 times a day for her stretches.  Discussed with patient on functional activities and stretches after.  Discussed not overuse of the upper trap muscle.

## 2019-09-16 NOTE — PROGRESS NOTES
Subjective:  HPI                    Objective:  System    Physical Exam    General     ROS    Assessment/Plan:    ASSESSMENT/PLAN  Updated problem list and treatment plan:   STG/LTGs have been met:  Yes (See Goal flow sheet completed today.)  Progress toward STG/LTGs have been made:  Yes (See Goal flow sheet completed today.)  Assessment of Progress: The patient's condition is improving.  Self Management Plans:  Patient is independent in a home treatment program.  Patient is independent in self management of symptoms.    Abril continues to require the following intervention to meet STG and LT's:  PT intervention is no longer required to meet STG/LTG.    Recommendations:  This patient is ready to be discharged from therapy and continue their home treatment program.    Please refer to the daily flowsheet for treatment today, total treatment time and time spent performing 1:1 timed codes.

## 2020-07-07 ENCOUNTER — TRANSFERRED RECORDS (OUTPATIENT)
Dept: HEALTH INFORMATION MANAGEMENT | Facility: CLINIC | Age: 85
End: 2020-07-07

## 2020-08-06 ENCOUNTER — OFFICE VISIT (OUTPATIENT)
Dept: INTERNAL MEDICINE | Facility: CLINIC | Age: 85
End: 2020-08-06
Payer: MEDICARE

## 2020-08-06 VITALS
HEIGHT: 60 IN | HEART RATE: 76 BPM | DIASTOLIC BLOOD PRESSURE: 80 MMHG | SYSTOLIC BLOOD PRESSURE: 124 MMHG | OXYGEN SATURATION: 96 % | TEMPERATURE: 97.5 F | WEIGHT: 152 LBS | RESPIRATION RATE: 16 BRPM | BODY MASS INDEX: 29.84 KG/M2

## 2020-08-06 DIAGNOSIS — E78.5 HYPERLIPIDEMIA LDL GOAL <100: Primary | ICD-10-CM

## 2020-08-06 DIAGNOSIS — Z13.6 CARDIOVASCULAR SCREENING; LDL GOAL LESS THAN 100: ICD-10-CM

## 2020-08-06 DIAGNOSIS — J44.9 CHRONIC OBSTRUCTIVE PULMONARY DISEASE, UNSPECIFIED COPD TYPE (H): ICD-10-CM

## 2020-08-06 DIAGNOSIS — N39.41 URGE INCONTINENCE OF URINE: ICD-10-CM

## 2020-08-06 DIAGNOSIS — H35.30 MACULAR DEGENERATION (SENILE) OF RETINA: ICD-10-CM

## 2020-08-06 DIAGNOSIS — Z00.00 MEDICARE ANNUAL WELLNESS VISIT, SUBSEQUENT: ICD-10-CM

## 2020-08-06 DIAGNOSIS — Z13.1 SCREENING FOR DIABETES MELLITUS: ICD-10-CM

## 2020-08-06 LAB
ANION GAP SERPL CALCULATED.3IONS-SCNC: 5 MMOL/L (ref 3–14)
BUN SERPL-MCNC: 12 MG/DL (ref 7–30)
CALCIUM SERPL-MCNC: 9 MG/DL (ref 8.5–10.1)
CHLORIDE SERPL-SCNC: 103 MMOL/L (ref 94–109)
CHOLEST SERPL-MCNC: 266 MG/DL
CO2 SERPL-SCNC: 27 MMOL/L (ref 20–32)
CREAT SERPL-MCNC: 0.8 MG/DL (ref 0.52–1.04)
GFR SERPL CREATININE-BSD FRML MDRD: 69 ML/MIN/{1.73_M2}
GLUCOSE SERPL-MCNC: 95 MG/DL (ref 70–99)
HDLC SERPL-MCNC: 78 MG/DL
LDLC SERPL CALC-MCNC: 169 MG/DL
NONHDLC SERPL-MCNC: 188 MG/DL
POTASSIUM SERPL-SCNC: 4.6 MMOL/L (ref 3.4–5.3)
SODIUM SERPL-SCNC: 135 MMOL/L (ref 133–144)
TRIGL SERPL-MCNC: 97 MG/DL

## 2020-08-06 PROCEDURE — 80048 BASIC METABOLIC PNL TOTAL CA: CPT | Performed by: INTERNAL MEDICINE

## 2020-08-06 PROCEDURE — 36415 COLL VENOUS BLD VENIPUNCTURE: CPT | Performed by: INTERNAL MEDICINE

## 2020-08-06 PROCEDURE — G0439 PPPS, SUBSEQ VISIT: HCPCS | Performed by: INTERNAL MEDICINE

## 2020-08-06 PROCEDURE — 80061 LIPID PANEL: CPT | Performed by: INTERNAL MEDICINE

## 2020-08-06 RX ORDER — FLUTICASONE PROPIONATE AND SALMETEROL XINAFOATE 115; 21 UG/1; UG/1
2 AEROSOL, METERED RESPIRATORY (INHALATION) 2 TIMES DAILY
COMMUNITY
Start: 2020-08-06

## 2020-08-06 ASSESSMENT — ASTHMA QUESTIONNAIRES
QUESTION_1 LAST FOUR WEEKS HOW MUCH OF THE TIME DID YOUR ASTHMA KEEP YOU FROM GETTING AS MUCH DONE AT WORK, SCHOOL OR AT HOME: NONE OF THE TIME
ACT_TOTALSCORE: 23
QUESTION_4 LAST FOUR WEEKS HOW OFTEN HAVE YOU USED YOUR RESCUE INHALER OR NEBULIZER MEDICATION (SUCH AS ALBUTEROL): NOT AT ALL
QUESTION_2 LAST FOUR WEEKS HOW OFTEN HAVE YOU HAD SHORTNESS OF BREATH: NOT AT ALL
QUESTION_5 LAST FOUR WEEKS HOW WOULD YOU RATE YOUR ASTHMA CONTROL: WELL CONTROLLED
QUESTION_3 LAST FOUR WEEKS HOW OFTEN DID YOUR ASTHMA SYMPTOMS (WHEEZING, COUGHING, SHORTNESS OF BREATH, CHEST TIGHTNESS OR PAIN) WAKE YOU UP AT NIGHT OR EARLIER THAN USUAL IN THE MORNING: ONCE OR TWICE

## 2020-08-06 ASSESSMENT — MIFFLIN-ST. JEOR: SCORE: 1070.97

## 2020-08-06 ASSESSMENT — ACTIVITIES OF DAILY LIVING (ADL): CURRENT_FUNCTION: NO ASSISTANCE NEEDED

## 2020-08-06 NOTE — PATIENT INSTRUCTIONS
Continue current meds  Labs today as ordered  Check with insurance or speak with your pharmacist re: Shingrix vaccine coverage for shingles prevention.  This is a 2 shot series done 2-6 months apart  I would recommend you receive an influenza (flu) vaccine  this Fall (September or October)  Trial off of caffeine and reducing PM liquids to see if helps with nighttime urination. If not helping and urine frequency bothersome, then contact me and will start daily medication therapy (Trospium)  May use Aquaphor as barrier as needed for  skin irritation from urination  Continue follow-up with pulmonary and ophthalmology per their recommendations

## 2020-08-06 NOTE — LETTER
[facility]  [address] [address]  [phone]      10/4/2020       Abril Aguilar  [address] [facility] [address]  [facility] [address]        Dear Abril,  A   review of your chart   showed that your lab results from August were previously reviewed by myself  and noted to be  not  requiring any acute intervention but were not sent to you with recommendations for follow-up at that time. I apologize for the long delay in conveying these results to you.   Here are your most recent lab results. Unless commented on below, mild variation of results  outside the normal range are not clinically signicant.    Resulted Orders   Basic metabolic panel   Result Value Ref Range    Sodium 135 133 - 144 mmol/L    Potassium 4.6 3.4 - 5.3 mmol/L    Chloride 103 94 - 109 mmol/L    Carbon Dioxide 27 20 - 32 mmol/L    Anion Gap 5 3 - 14 mmol/L    Glucose 95 70 - 99 mg/dL      Comment:      Fasting specimen    Urea Nitrogen 12 7 - 30 mg/dL    Creatinine 0.80 0.52 - 1.04 mg/dL    GFR Estimate 69 >60 mL/min/[1.73_m2]      Comment:      Non  GFR Calc  Starting 12/18/2018, serum creatinine based estimated GFR (eGFR) will be   calculated using the Chronic Kidney Disease Epidemiology Collaboration   (CKD-EPI) equation.      GFR Estimate If Black 80 >60 mL/min/[1.73_m2]      Comment:       GFR Calc  Starting 12/18/2018, serum creatinine based estimated GFR (eGFR) will be   calculated using the Chronic Kidney Disease Epidemiology Collaboration   (CKD-EPI) equation.      Calcium 9.0 8.5 - 10.1 mg/dL   Lipid panel reflex to direct LDL Fasting   Result Value Ref Range    Cholesterol 266 (H) <200 mg/dL      Comment:      Desirable:       <200 mg/dl    Triglycerides 97 <150 mg/dL      Comment:      Fasting specimen    HDL Cholesterol 78 >49 mg/dL    LDL Cholesterol Calculated 169 (H) <100 mg/dL      Comment:      Above desirable:  100-129 mg/dl  Borderline High:  130-159  "mg/dL  High:             160-189 mg/dL  Very high:       >189 mg/dl      Non HDL Cholesterol 188 (H) <130 mg/dL      Comment:      Above Desirable:  130-159 mg/dl  Borderline high:  160-189 mg/dl  High:             190-219 mg/dl  Very high:       >219 mg/dl         HDL (good) Cholesterol, Triglycerides, Electrolyte, Glucose/Sugar and Kidney function lab results were normal.  Total Cholesterol, HDL (good) Cholesterol and Non-HDL (bad) cholesterol lab results were abnormal.  Abnormalities of these lab values increase the risk for heart and other vascular disease.  You have expressed to my nurses through previous telephone conversations that you did not wish to use statin cholesterol lowering medication to treat these cholesterol result abnormalities.  Therefore I would encourage lifestyle treatment as able  Reduce saturated fats (red meats, fried and processed foods) in your diet and increase the amount of color on your plate with fruits and vegetables.  Increase walking exercise as able.  Goal is to do some daily  Call our appointment desk at [phone] or use [employer] to schedule a \"lab only\" to have your Lipid profile (Cholesterol Panel) checked fasting in 6 months.  For fasting labs, please refrain from eating for 8 hours or more.  Be sure to  drink water and take your  medications the day of the test.    If you have further questions/concerns regarding the results, I would ask that you bring them to your next follow-up appointment with me and I would be happy to review them with you further.      Sincerely,      Julio Cesar Carrero MD  Internal Medicine      "

## 2020-08-06 NOTE — PROGRESS NOTES
"SUBJECTIVE:   Abril Aguilar is a 84 year old female who presents for Preventive Visit.  Are you in the first 12 months of your Medicare coverage?  No    Healthy Habits:     In general, how would you rate your overall health?  Excellent    Frequency of exercise:  None    Duration of exercise:  Other    Do you usually eat at least 4 servings of fruit and vegetables a day, include whole grains    & fiber and avoid regularly eating high fat or \"junk\" foods?  Yes    Taking medications regularly:  Yes    Barriers to taking medications:  None    Medication side effects:  None    Ability to successfully perform activities of daily living:  No assistance needed    Home Safety:  No safety concerns identified    Hearing Impairment:  No hearing concerns    In the past 6 months, have you been bothered by leaking of urine?  No    In general, how would you rate your overall mental or emotional health?  Excellent      PHQ-2 Total Score: 0    Additional concerns today:  No    Do you feel safe in your environment? Yes    Have you ever done Advance Care Planning? (For example, a Health Directive, POLST, or a discussion with a medical provider or your loved ones about your wishes): Yes, advance care planning is on file.    Fall risk  Fallen 2 or more times in the past year?: No  Any fall with injury in the past year?: No    Cognitive Screening   1) Repeat 3 items (Leader, Season, Table)    2) Clock draw: ABNORMAL  3) 3 item recall: Recalls 3 objects  Results: ABNORMAL clock, 3 items recalled: PROBABLE COGNITIVE IMPAIRMENT, **INFORM PROVIDER**  Six Item Cognitive Impairment Test   (6CIT):      What year is it?                               Correct - 0 points    What month is it?                               Correct - 0 points      Give the patient an address to remember with five components:   Nasim Thompson ( first and last name - 2 components)   [address]  (number and name of street - 2 components)   [place] ( city - 1 " component)      About what time is it (within the hour)? Correct - 0 points    Count backwards from 20 to 1:   Correct - 0 points    Say the months of the year in reverse: Correct - 0 points    Repeat the address phrase:   4 errors - 8 points    Total 6CIT Score:      08/28    Interpretation: The 6CIT uses an inverse score and questions are weighted to produce a total out of 28. Scores of 0-7 are considered normal and 8 or more significant.    Advantages The test has high sensitivity without compromising specificity even in mild dementia. It is easy to translate linguistically and culturally.  Disadvantages The main disadvantage is in the scoring and weighting of the test, which is initially confusing, however computer models have simplified this greatly.    Probability Statistics: At the 7/8 cut off: Overall figures sensitivity 90% specificity 100%, in mild dementia sensitivity = 78% , specificity = 100%    Copyright 2000 [facility], [facility]. Courtesy of Dr. Ike Lozano       Do you have sleep apnea, excessive snoring or daytime drowsiness?: no    Reviewed and updated as needed this visit by clinical staff  Allergies         Reviewed and updated as needed this visit by Provider        Social History     Tobacco Use     Smoking status: Never Smoker     Smokeless tobacco: Never Used   Substance Use Topics     Alcohol use: No     If you drink alcohol do you typically have >3 drinks per day or >7 drinks per week? No    Alcohol Use 5/20/2019   Prescreen: >3 drinks/day or >7 drinks/week? No   Prescreen: >3 drinks/day or >7 drinks/week? -             Current providers sharing in care for this patient include:   Patient Care Team:  Julio Cesar Carrero MD as PCP - General (Internal Medicine)  Julio Cesar Carrero MD as Assigned PCP    The following health maintenance items are reviewed in Epic and correct as of today:  Health Maintenance   Topic Date Due     ZOSTER IMMUNIZATION (2 of 3) 08/24/2009     ASTHMA  "ACTION PLAN  05/17/2019     ASTHMA CONTROL TEST  11/20/2019     PHQ-2  01/01/2020     MEDICARE ANNUAL WELLNESS VISIT  05/20/2020     FALL RISK ASSESSMENT  05/20/2020     INFLUENZA VACCINE (1) 09/01/2020     ADVANCE CARE PLANNING  07/19/2024     DTAP/TDAP/TD IMMUNIZATION (3 - Td) 05/27/2025     DEXA  Completed     PNEUMOCOCCAL IMMUNIZATION 65+ LOW/MEDIUM RISK  Completed     IPV IMMUNIZATION  Aged Out     MENINGITIS IMMUNIZATION  Aged Out     Labs reviewed in EPIC      Review of Systems  CONSTITUTIONAL: NEGATIVE for fever, chills, change in weight  INTEGUMENTARY/SKIN: NEGATIVE for worrisome rashes, moles or lesions  EYES: NEGATIVE for vision changes or irritation. Hx mac degen and shots  Left eye every 8-9 weeks. Chronic reduced vision  right  ENT/MOUTH: NEGATIVE for ear, mouth and throat problems  RESP: NEGATIVE for significant cough or SOB with use of Advair. Saww Pulmonary 3-4 weeks ago.  Hx COPD.  On Advair  BREAST: NEGATIVE for masses, tenderness or discharge  CV: NEGATIVE for chest pain, palpitations or peripheral edema  GI: NEGATIVE for nausea, abdominal pain, heartburn, or change in bowel habits  : NEGATIVE for frequency, dysuria, or hematuria.  Has nocturia x3 on average. 1.5 cups coffee in AM. Fluids through the day  MUSCULOSKELETAL: NEGATIVE for significant arthralgias or myalgia that limit activity. Rare thumb   NEURO: NEGATIVE for weakness, dizziness or paresthesias  ENDOCRINE: NEGATIVE for temperature intolerance, skin/hair changes  HEME: NEGATIVE for bleeding problems  PSYCHIATRIC: NEGATIVE for changes in mood or affect    OBJECTIVE:   /80   Pulse 76   Temp 97.5  F (36.4  C) (Temporal)   Resp 16   Ht 1.524 m (5')   Wt 68.9 kg (152 lb)   SpO2 96%   BMI 29.69 kg/m   Estimated body mass index is 27.44 kg/m  as calculated from the following:    Height as of 8/1/19: 1.575 m (5' 2\").    Weight as of 8/1/19: 68 kg (150 lb).  Physical Exam  General appearance -   alert, no distress  Skin - No " rashes or lesions.  Head - normocephalic, atraumatic  Eyes - KURT, EOMI, fundi exam with nondilated pupils negative.  Ears - External ears normal. Canals clear. TM's normal.  Nose/Sinuses - Nares normal. Septum midline. Mucosa normal. No drainage or sinus tenderness.  Oropharynx - No erythema, no adenopathy, no exudates.  Neck - Supple without adenopathy or thyromegaly. No bruits.  Lungs - Clear to auscultation without wheezes/rhonchi. Minimal prolongation exp phase  Heart - Regular rate and rhythm without murmurs, clicks, or gallops.  Nodes - No supraclavicular, axillary, or inguinal adenopathy palpable.  Breasts - deferred  Abdomen - Abdomen soft, non-tender. BS normal. No masses or hepatosplenomegaly palpable. No bruits.  Extremities -No cyanosis, clubbing or edema.    Musculoskeletal - Spine ROM normal. Muscular strength intact.  DIP and PIP joints with minimal osteoarthritis changes. No erythema/increased warmth of joints  Peripheral pulses - radial=4/4, femoral=4/4, posterior tibial=4/4, dorsalis pedis=4/4,  Neuro - Gait normal. Reflexes normal and symmetric. Sensation grossly WNL.  Genital/Rectal - deferred      ASSESSMENT / PLAN:   1. Medicare annual wellness visit, subsequent  Lab screening as below.  Counseled regarding vaccinations.  Patient has not been getting flu shots before and is hesitant to do so.  Discussed Shingrix.  Patient declines mammogram screening.  Previous history of mild osteopenia of the hip 5 years ago.  Patient also not wishing to consider medication therapy for that so wishes to defer follow-up DEXA screening at this time    2. Chronic obstructive pulmonary disease, unspecified COPD type (H)  Controlled.  Continue Advair as per pulmonary    3. Screening for diabetes mellitus  - Basic metabolic panel    4. CARDIOVASCULAR SCREENING; LDL GOAL LESS THAN 100  - Lipid panel reflex to direct LDL Fasting    5. Macular degeneration (senile) of retina  Receiving injection therapy every couple  "months.  Continue management per ophthalmology    6. Urge incontinence of urine  Mild symptoms with some nocturia.  Not bothersome to the point the patient wishes to use medication at this time.  See counseling below regarding lifestyle change.  Possible future medication options discussed with patient      COUNSELING:  Reviewed preventive health counseling, as reflected in patient instructions    Estimated body mass index is 27.44 kg/m  as calculated from the following:    Height as of 8/1/19: 1.575 m (5' 2\").    Weight as of 8/1/19: 68 kg (150 lb).         reports that she has never smoked. She has never used smokeless tobacco.      Appropriate preventive services were discussed with this patient, including applicable screening as appropriate for cardiovascular disease, diabetes, osteopenia/osteoporosis, and glaucoma.  As appropriate for age/gender, discussed screening for colorectal cancer, prostate cancer, breast cancer, and cervical cancer. Checklist reviewing preventive services available has been given to the patient.    Reviewed patients plan of care and provided an AVS. The Basic Care Plan (routine screening as documented in Health Maintenance) for Abril meets the Care Plan requirement. This Care Plan has been established and reviewed with the Patient.    Counseling Resources:  ATP IV Guidelines  Pooled Cohorts Equation Calculator  Breast Cancer Risk Calculator  FRAX Risk Assessment  ICSI Preventive Guidelines  Dietary Guidelines for Americans, 2010  USDA's MyPlate  ASA Prophylaxis  Lung CA Screening      PLAN:  Continue current meds  Labs today as ordered  Check with insurance or speak with your pharmacist re: Shingrix vaccine coverage for shingles prevention.  This is a 2 shot series done 2-6 months apart  I would recommend you receive an influenza (flu) vaccine  this Fall (September or October)  Trial off of caffeine and reducing PM liquids to see if helps with nighttime urination. If not helping and " urine frequency bothersome, then contact me and will start daily medication therapy (Trospium)  May use Aquaphor as barrier as needed for  skin irritation from urination  Continue follow-up with pulmonary and ophthalmology per their recommendations  Patient declines mammogram and DEXA    Julio Cesar Carrero MD  [facility]

## 2020-08-07 ASSESSMENT — ASTHMA QUESTIONNAIRES: ACT_TOTALSCORE: 23

## 2020-08-21 ENCOUNTER — TELEPHONE (OUTPATIENT)
Dept: INTERNAL MEDICINE | Facility: CLINIC | Age: 85
End: 2020-08-21

## 2020-08-21 NOTE — TELEPHONE ENCOUNTER
Patient called looking for her lab results from her physical on 8/6/2020 please call patient regarding this   [phone]

## 2020-09-09 NOTE — TELEPHONE ENCOUNTER
Patient called again requesting results to lab tests from 8/6/20. Lab results discussed. She is not interested in cholesterol lowering medication and is not surprised that cholesterol is high. She would like a lab letter sent to her house when PCP gets to it.

## 2020-10-06 ENCOUNTER — TRANSFERRED RECORDS (OUTPATIENT)
Dept: HEALTH INFORMATION MANAGEMENT | Facility: CLINIC | Age: 85
End: 2020-10-06

## 2020-10-15 ENCOUNTER — VIRTUAL VISIT (OUTPATIENT)
Dept: URGENT CARE | Facility: CLINIC | Age: 85
End: 2020-10-15
Payer: MEDICARE

## 2020-10-15 DIAGNOSIS — Z20.822 EXPOSURE TO COVID-19 VIRUS: Primary | ICD-10-CM

## 2020-10-15 PROCEDURE — 99207 PR NO CHARGE LOS: CPT | Performed by: FAMILY MEDICINE

## 2020-10-15 NOTE — PROGRESS NOTES
10:09 am left message   10:24 am left message    Patient already got addressed and scheduled at a different clinic  Still had a few questions about exposure which I tried to address to the best of my ability    Patient is asymptomatic exposed 6 days ago.     If you know you have had close contact with someone who tested positive, you should be quarantined for 14 days after this exposure. You should stay in quarantine for the14 days even if the covid test is negative, the optimal time to test after exposure is 5-7 days from the exposure  Quarantine means   What should I do?  For safety, it's very important to follow these rules. Do this for 14 days after the date you were last exposed to the virus..  Stay home and away from others. Don't go to school or anywhere else. Generally quarantine means staying home for work but there are some exceptions to this. Please contact your workplace.   No hugging, kissing or shaking hands.  Don't let anyone visit.  Cover your mouth and nose with a mask, tissue or washcloth to avoid spreading germs.  Wash your hands and face often. Use soap and water.  What are the symptoms of COVID-19?  The most common symptoms are cough, fever and trouble breathing. Less common symptoms include headache, body aches, fatigue (feeling very tired), chills, sore throat, stuffy or runny nose, diarrhea (loose poop), loss of taste or smell, belly pain, and nausea or vomiting (feeling sick to your stomach or throwing up).  After 14 days, if you have still don't have symptoms, you likely don't have this virus.  If you develop symptoms, follow these guidelines.  If you're normally healthy: Please start another [facility] visit to report your symptoms. Go to [email or web address].  If you have a serious health problem (like cancer, heart failure, an organ transplant or kidney disease): Call your specialty clinic. Let them know that you might have COVID-19.  2. When it's time for your COVID test:  Stay at least 6 feet away  from others. (If someone will drive you to your test, stay in the backseat, as far away from the  as you can.)  Cover your mouth and nose with a mask, tissue or washcloth.  Go straight to the testing site. Don't make any stops on the way there or back.  Please note  Caregivers in these groups are at risk for severe illness due to COVID-19:  o People 65 years and older  o People who live in a nursing home or long-term care facility  o People with chronic disease (lung, heart, cancer, diabetes, kidney, liver, immunologic)  o People who have a weakened immune system, including those who:  Are in cancer treatment  Take medicine that weakens the immune system, such as corticosteroids  Had a bone marrow or organ transplant  Have an immune deficiency  Have poorly controlled HIV or AIDS  Are obese (body mass index of 40 or higher)  Smoke regularly  Where can I get more information?  [facility] - About COVID-19: [email or web address]  CDC - What to Do If You're Sick: www.cdc.gov/coronavirus/2019-ncov/about/steps-when-sick.html  CDC - Ending Home Isolation: www.cdc.gov/coronavirus/2019-ncov/hcp/disposition-in-home-patients.html  CDC - Caring for Someone: www.cdc.gov/coronavirus/2019-ncov/if-you-are-sick/care-for-someone.html  [facility] - Interim Guidance for Hospital Discharge to Home: www.health.[facility].[place]/diseases/coronavirus/hcp/hospdischarge.pdf  [facility] clinical trials (COVID-19 research studies): clinicalaffairs.[facility]-clinical-trials  Below are the COVID-19 hotlines at the [place] Department of Health ([facility]). Interpreters are available.  For health questions: Call 466-586-1618 or 1-616.971.4948 (7 a.m. to 7 p.m.)   For questions about schools and childcare: Call 058-177-8739 or 1-796.541.3197 (7 a.m. to 7 p.m.)    Patricia Joyce M.D.

## 2021-01-17 ENCOUNTER — HEALTH MAINTENANCE LETTER (OUTPATIENT)
Age: 86
End: 2021-01-17

## 2021-01-26 ENCOUNTER — MYC MEDICAL ADVICE (OUTPATIENT)
Dept: INTERNAL MEDICINE | Facility: CLINIC | Age: 86
End: 2021-01-26

## 2021-02-24 ENCOUNTER — IMMUNIZATION (OUTPATIENT)
Dept: NURSING | Facility: CLINIC | Age: 86
End: 2021-02-24
Payer: MEDICARE

## 2021-02-24 PROCEDURE — 0001A PR COVID VAC PFIZER DIL RECON 30 MCG/0.3 ML IM: CPT

## 2021-02-24 PROCEDURE — 91300 PR COVID VAC PFIZER DIL RECON 30 MCG/0.3 ML IM: CPT

## 2021-03-17 ENCOUNTER — IMMUNIZATION (OUTPATIENT)
Dept: NURSING | Facility: CLINIC | Age: 86
End: 2021-03-17
Attending: INTERNAL MEDICINE
Payer: MEDICARE

## 2021-03-17 PROCEDURE — 91300 PR COVID VAC PFIZER DIL RECON 30 MCG/0.3 ML IM: CPT

## 2021-03-17 PROCEDURE — 0002A PR COVID VAC PFIZER DIL RECON 30 MCG/0.3 ML IM: CPT

## 2021-04-15 ENCOUNTER — TRANSFERRED RECORDS (OUTPATIENT)
Dept: HEALTH INFORMATION MANAGEMENT | Facility: CLINIC | Age: 86
End: 2021-04-15

## 2021-07-12 ENCOUNTER — TRANSFERRED RECORDS (OUTPATIENT)
Dept: HEALTH INFORMATION MANAGEMENT | Facility: CLINIC | Age: 86
End: 2021-07-12

## 2021-08-27 ENCOUNTER — OFFICE VISIT (OUTPATIENT)
Dept: INTERNAL MEDICINE | Facility: CLINIC | Age: 86
End: 2021-08-27
Payer: MEDICARE

## 2021-08-27 VITALS
OXYGEN SATURATION: 98 % | TEMPERATURE: 97.6 F | HEIGHT: 60 IN | BODY MASS INDEX: 28.27 KG/M2 | SYSTOLIC BLOOD PRESSURE: 132 MMHG | RESPIRATION RATE: 16 BRPM | WEIGHT: 144 LBS | DIASTOLIC BLOOD PRESSURE: 78 MMHG | HEART RATE: 84 BPM

## 2021-08-27 DIAGNOSIS — N39.41 URGE INCONTINENCE OF URINE: ICD-10-CM

## 2021-08-27 DIAGNOSIS — Z00.00 MEDICARE ANNUAL WELLNESS VISIT, SUBSEQUENT: Primary | ICD-10-CM

## 2021-08-27 DIAGNOSIS — J44.9 CHRONIC OBSTRUCTIVE PULMONARY DISEASE, UNSPECIFIED COPD TYPE (H): ICD-10-CM

## 2021-08-27 DIAGNOSIS — M19.91 PRIMARY OSTEOARTHRITIS, UNSPECIFIED SITE: ICD-10-CM

## 2021-08-27 PROCEDURE — G0439 PPPS, SUBSEQ VISIT: HCPCS | Performed by: INTERNAL MEDICINE

## 2021-08-27 ASSESSMENT — MIFFLIN-ST. JEOR: SCORE: 1029.68

## 2021-08-27 ASSESSMENT — ASTHMA QUESTIONNAIRES
ACT_TOTALSCORE: 24
QUESTION_3 LAST FOUR WEEKS HOW OFTEN DID YOUR ASTHMA SYMPTOMS (WHEEZING, COUGHING, SHORTNESS OF BREATH, CHEST TIGHTNESS OR PAIN) WAKE YOU UP AT NIGHT OR EARLIER THAN USUAL IN THE MORNING: NOT AT ALL
QUESTION_4 LAST FOUR WEEKS HOW OFTEN HAVE YOU USED YOUR RESCUE INHALER OR NEBULIZER MEDICATION (SUCH AS ALBUTEROL): NOT AT ALL
QUESTION_1 LAST FOUR WEEKS HOW MUCH OF THE TIME DID YOUR ASTHMA KEEP YOU FROM GETTING AS MUCH DONE AT WORK, SCHOOL OR AT HOME: NONE OF THE TIME
QUESTION_5 LAST FOUR WEEKS HOW WOULD YOU RATE YOUR ASTHMA CONTROL: WELL CONTROLLED
QUESTION_2 LAST FOUR WEEKS HOW OFTEN HAVE YOU HAD SHORTNESS OF BREATH: NOT AT ALL

## 2021-08-27 NOTE — PROGRESS NOTES
ASSESSMENT:   1. Medicare annual wellness visit, subsequent  See plan discussion below regarding vaccinations.  Patient declines further cancer screening.  Has been taking aspirin for prevention.  No history of CAD or prior stroke.  Given age, greater risk for GI bleed and other harm compared to preventative benefits of aspirin.  Patient therefore stop daily aspirin use. Declines screening labs.  Prior hyperlipidemia but patient states she would not consider medication therapy for it.  Previous intolerance to statin x2    2. Urge incontinence of urine  Discussed lifestyle changes along with medication and procedure options.  Patient wishes to simply modify lifestyle and monitor for now    3. Chronic obstructive pulmonary disease, unspecified COPD type (H)  Controlled.  Continue Advair therapy.  Also managed by pulmonary    4. Primary osteoarthritis, unspecified site  Symptoms not bothersome enough to patient that she wishes referral for cortisone injection.  Discussed stretching of hands in the morning under warm water.  Tylenol as needed      PLAN:   Stop Aspirin  Reduce fluid intake due in  the day to reduce urination frequency and potentially try stopping  caffeine intake  for a few days to see if helps   Possible prescription medication thereapy urine control. Call/Signalhart message me if wish to start  I would recommend you receive an influenza (flu) vaccine  this Fall (October)  Probable covid vaccine booster shot 8 months after your previous vaccine series. Will await further guidance from the CDC in September  Check with insurance or speak with your pharmacist re: Shingrix vaccine coverage for shingles prevention.  This is a 2 shot series done 2-6 months apart        (Chart documentation was completed, in part, with Silentium voice-recognition software. Even though reviewed, some grammatical, spelling, and word errors may remain.)    Julio Cesar Carrero MD  Internal Medicine Department  [facility]  "[place] REBECCA Samuels is a 85 year old who presents for the following health issues     HPI   Chief Complaint   Patient presents with     Bladder Problems     Patient c/o urinating frequently during the night     Add on     Wellness       Annual Wellness Visit    Patient has been advised of split billing requirements and indicates understanding: Yes     Are you in the first 12 months of your Medicare Part B coverage?  No    Physical Health:    In general, how would you rate your overall physical health? excellent    Outside of work, how many days during the week do you exercise?none    Outside of work, approximately how many minutes a day do you exercise?not applicable    If you drink alcohol do you typically have >3 drinks per day or >7 drinks per week? No    Do you usually eat at least 4 servings of fruit and vegetables a day, include whole grains & fiber and avoid regularly eating high fat or \"junk\" foods? Yes    Do you have any problems taking medications regularly? No    Do you have any side effects from medications? not applicable    Needs assistance for the following daily activities: no assistance needed    Which of the following safety concerns are present in your home?  none identified     Hearing impairment: No    In the past 6 months, have you been bothered by leaking of urine? yes    Mental Health:    In general, how would you rate your overall mental or emotional health? excellent  PHQ-2 Score:0  Do you feel safe in your environment? Yes    Have you ever done Advance Care Planning? (For example, a Health Directive, POLST, or a discussion with a medical provider or your loved ones about your wishes)? Yes, advance care planning is on file.    Fall risk:  Fallen 2 or more times in the past year?: No  Any fall with injury in the past year?: No    Cognitive Screenin) Repeat 3 items (Leader, Season, Table)    2) Clock draw: NORMAL  3) 3 item recall: Recalls 3 objects  Results: " NORMAL clock, 3 items recalled: COGNITIVE IMPAIRMENT LESS LIKELY    Mini-CogTM Copyright S Melany. Licensed by the author for use in [facility]; reprinted with permission (nitza@.[facility]). All rights reserved.      Do you have sleep apnea, excessive snoring or daytime drowsiness?: no    Current providers sharing in care for this patient include:   Patient Care Team:  Julio Cesar Carrero MD as PCP - General (Internal Medicine)  Julio Cesar Carrero MD as Assigned PCP    Patient has been advised of split billing requirements and indicates understanding: Yes       Most recent lab results reviewed with pt.      Review of systems and discussion re: recent sx combined as below:    CONSTITUTIONAL: NEGATIVE for fever, chills. Weight down 8 pounds  INTEGUMENTARY/SKIN: NEGATIVE for worrisome moles or lesions. Occ irritation around labial area with incontinence.  Using occ 1% hydrocortisone or Aquaphor with good benefit  EYES: NEGATIVE for acute vision changes or irritation. Has glasses.  Getting injections every 9 weeks for macular degeneration  ENT/MOUTH: NEGATIVE for ear, mouth and throat problems  RESP: NEGATIVE for significant cough or SOB with current Advair use. Followed by Dr Roberts form [facility]  BREAST: NEGATIVE for masses, tenderness or discharge  CV: NEGATIVE for chest pain, palpitations or peripheral edema  GI: NEGATIVE for nausea, abdominal pain, heartburn, or change in bowel habits  : NEGATIVE for   dysuria, or hematuria. Has nocturia every 2 hours. About every 3-4 hrs during the day. 1 cup coffee/day. Water 8-12 oz 5-6/day. Clear urine. Slight urge feeling. Has some stress incontinence also. Hx pessary use in past. Stopped  After having a bladder infection once that took long time to clear 2-14  MUSCULOSKELETAL: NEGATIVE for significant arthralgias or myalgia except occ thumb base pain. Hx  osteoarthritis. Decliens injection at this time  NEURO: NEGATIVE for weakness, dizziness or paresthesias  ENDOCRINE:  NEGATIVE for temperature intolerance, skin/hair changes  HEME: NEGATIVE for bleeding problems  PSYCHIATRIC: NEGATIVE for changes in mood or affect         OBJECTIVE:  /78   Pulse 84   Temp 97.6  F (36.4  C) (Temporal)   Resp 16   Ht 1.524 m (5')   Wt 65.3 kg (144 lb)   SpO2 98%   BMI 28.12 kg/m     Estimated body mass index is 28.12 kg/m  as calculated from the following:    Height as of this encounter: 1.524 m (5').    Weight as of this encounter: 65.3 kg (144 lb).  General appearance -  alert, no distress  Skin - No rashes or lesions.  Head - normocephalic, atraumatic  Eyes - KURT, EOMI, fundi exam with nondilated pupils negative.  Ears - External ears normal. Canals clear. TM's normal.  Nose/Sinuses - Nares normal. Septum midline. Mucosa normal. No drainage or sinus tenderness.  Oropharynx - No erythema, no adenopathy, no exudates.  Neck - Supple without adenopathy or thyromegaly. No bruits.  Lungs - Clear to auscultation without wheezes/rhonchi.  Speaking easily  Heart - Regular rate and rhythm without murmurs, clicks, or gallops.  Nodes - No supraclavicular, axillary, or inguinal adenopathy palpable.  Breasts - deferred  Abdomen - Abdomen soft, non-tender. BS normal. No masses or hepatosplenomegaly palpable. No bruits.  Extremities -No cyanosis, clubbing.  Minimal bilateral lower extremity edema.    Musculoskeletal - Spine ROM normal. Muscular strength intact. DIP and PIP joints of hands bilaterally with osteoarthritis thickening. No acute warmth erythema. Minimal tenderness to range of motion bilateral thumb bases  Peripheral pulses - radial=4/4, femoral=4/4, posterior tibial=4/4, dorsalis pedis=4/4,  Neuro - Gait normal. Reflexes normal and symmetric. Sensation grossly WNL.  Genital/Rectal - deferred

## 2021-08-27 NOTE — PATIENT INSTRUCTIONS
Stop Aspirin  Reduce fluid intake due in  the day to reduce urination frequency and potentially try stopping  caffeine intake  for a few days to see if helps   Possible prescription medication thereapy urine control. Call/mychart message me if wish to start  I would recommend you receive an influenza (flu) vaccine  this Fall (October)  Probable covid vaccine booster shot 8 months after your previous vaccine series. Will await further guidance from the CDC in September  Check with insurance or speak with your pharmacist re: Shingrix vaccine coverage for shingles prevention.  This is a 2 shot series done 2-6 months apart

## 2021-08-28 PROBLEM — M19.91 PRIMARY OSTEOARTHRITIS, UNSPECIFIED SITE: Status: ACTIVE | Noted: 2021-08-28

## 2021-08-28 ASSESSMENT — ASTHMA QUESTIONNAIRES: ACT_TOTALSCORE: 24

## 2021-10-24 ENCOUNTER — HEALTH MAINTENANCE LETTER (OUTPATIENT)
Age: 86
End: 2021-10-24

## 2022-01-07 ENCOUNTER — NURSE TRIAGE (OUTPATIENT)
Dept: NURSING | Facility: CLINIC | Age: 87
End: 2022-01-07
Payer: COMMERCIAL

## 2022-01-08 ENCOUNTER — APPOINTMENT (OUTPATIENT)
Dept: CT IMAGING | Facility: CLINIC | Age: 87
DRG: 177 | End: 2022-01-08
Attending: EMERGENCY MEDICINE
Payer: MEDICARE

## 2022-01-08 ENCOUNTER — HOSPITAL ENCOUNTER (INPATIENT)
Facility: CLINIC | Age: 87
LOS: 2 days | Discharge: HOME | DRG: 177 | End: 2022-01-10
Attending: EMERGENCY MEDICINE | Admitting: STUDENT IN AN ORGANIZED HEALTH CARE EDUCATION/TRAINING PROGRAM
Payer: MEDICARE

## 2022-01-08 ENCOUNTER — OFFICE VISIT (OUTPATIENT)
Dept: URGENT CARE | Facility: URGENT CARE | Age: 87
End: 2022-01-08
Payer: MEDICARE

## 2022-01-08 VITALS
SYSTOLIC BLOOD PRESSURE: 138 MMHG | TEMPERATURE: 98.4 F | DIASTOLIC BLOOD PRESSURE: 76 MMHG | OXYGEN SATURATION: 86 % | WEIGHT: 145 LBS | RESPIRATION RATE: 38 BRPM | HEART RATE: 105 BPM | BODY MASS INDEX: 28.32 KG/M2

## 2022-01-08 DIAGNOSIS — I48.91 ATRIAL FIBRILLATION WITH RAPID VENTRICULAR RESPONSE (H): ICD-10-CM

## 2022-01-08 DIAGNOSIS — J18.9 COMMUNITY ACQUIRED PNEUMONIA, UNSPECIFIED LATERALITY: ICD-10-CM

## 2022-01-08 DIAGNOSIS — R06.02 SHORTNESS OF BREATH: ICD-10-CM

## 2022-01-08 DIAGNOSIS — J44.1 COPD EXACERBATION (H): ICD-10-CM

## 2022-01-08 DIAGNOSIS — R09.02 HYPOXIA: Primary | ICD-10-CM

## 2022-01-08 DIAGNOSIS — J18.9 PNEUMONIA OF BOTH LOWER LOBES DUE TO INFECTIOUS ORGANISM: ICD-10-CM

## 2022-01-08 DIAGNOSIS — R09.02 HYPOXIA: ICD-10-CM

## 2022-01-08 LAB
ANION GAP SERPL CALCULATED.3IONS-SCNC: 11 MMOL/L (ref 3–14)
BASOPHILS # BLD AUTO: 0 10E3/UL (ref 0–0.2)
BASOPHILS NFR BLD AUTO: 0 %
BUN SERPL-MCNC: 13 MG/DL (ref 7–30)
CALCIUM SERPL-MCNC: 9 MG/DL (ref 8.5–10.1)
CHLORIDE BLD-SCNC: 93 MMOL/L (ref 94–109)
CO2 SERPL-SCNC: 22 MMOL/L (ref 20–32)
CREAT SERPL-MCNC: 0.69 MG/DL (ref 0.52–1.04)
EOSINOPHIL # BLD AUTO: 0 10E3/UL (ref 0–0.7)
EOSINOPHIL NFR BLD AUTO: 0 %
ERYTHROCYTE [DISTWIDTH] IN BLOOD BY AUTOMATED COUNT: 13.2 % (ref 10–15)
FLUAV RNA SPEC QL NAA+PROBE: NEGATIVE
FLUBV RNA RESP QL NAA+PROBE: NEGATIVE
GFR SERPL CREATININE-BSD FRML MDRD: 85 ML/MIN/1.73M2
GLUCOSE BLD-MCNC: 104 MG/DL (ref 70–99)
HCT VFR BLD AUTO: 40.4 % (ref 35–47)
HGB BLD-MCNC: 13.1 G/DL (ref 11.7–15.7)
HOLD SPECIMEN: NORMAL
HOLD SPECIMEN: NORMAL
IMM GRANULOCYTES # BLD: 0.1 10E3/UL
IMM GRANULOCYTES NFR BLD: 0 %
L PNEUMO1 AG UR QL IA: NEGATIVE
LYMPHOCYTES # BLD AUTO: 0.5 10E3/UL (ref 0.8–5.3)
LYMPHOCYTES NFR BLD AUTO: 3 %
MAGNESIUM SERPL-MCNC: 2.3 MG/DL (ref 1.6–2.3)
MCH RBC QN AUTO: 29.9 PG (ref 26.5–33)
MCHC RBC AUTO-ENTMCNC: 32.4 G/DL (ref 31.5–36.5)
MCV RBC AUTO: 92 FL (ref 78–100)
MONOCYTES # BLD AUTO: 0.5 10E3/UL (ref 0–1.3)
MONOCYTES NFR BLD AUTO: 4 %
NEUTROPHILS # BLD AUTO: 14.1 10E3/UL (ref 1.6–8.3)
NEUTROPHILS NFR BLD AUTO: 93 %
NRBC # BLD AUTO: 0 10E3/UL
NRBC BLD AUTO-RTO: 0 /100
PLATELET # BLD AUTO: 198 10E3/UL (ref 150–450)
POTASSIUM BLD-SCNC: 3.5 MMOL/L (ref 3.4–5.3)
RBC # BLD AUTO: 4.38 10E6/UL (ref 3.8–5.2)
S PNEUM AG SPEC QL: NEGATIVE
SARS-COV-2 RNA RESP QL NAA+PROBE: NEGATIVE
SODIUM SERPL-SCNC: 126 MMOL/L (ref 133–144)
TROPONIN I SERPL HS-MCNC: 8 NG/L
TSH SERPL DL<=0.005 MIU/L-ACNC: 0.61 MU/L (ref 0.4–4)
WBC # BLD AUTO: 15.1 10E3/UL (ref 4–11)

## 2022-01-08 PROCEDURE — 93005 ELECTROCARDIOGRAM TRACING: CPT | Mod: 76

## 2022-01-08 PROCEDURE — 84443 ASSAY THYROID STIM HORMONE: CPT | Performed by: PHYSICIAN ASSISTANT

## 2022-01-08 PROCEDURE — C9803 HOPD COVID-19 SPEC COLLECT: HCPCS

## 2022-01-08 PROCEDURE — 80048 BASIC METABOLIC PNL TOTAL CA: CPT | Performed by: EMERGENCY MEDICINE

## 2022-01-08 PROCEDURE — 87149 DNA/RNA DIRECT PROBE: CPT | Performed by: EMERGENCY MEDICINE

## 2022-01-08 PROCEDURE — 250N000011 HC RX IP 250 OP 636: Performed by: EMERGENCY MEDICINE

## 2022-01-08 PROCEDURE — 85025 COMPLETE CBC W/AUTO DIFF WBC: CPT | Performed by: EMERGENCY MEDICINE

## 2022-01-08 PROCEDURE — 93005 ELECTROCARDIOGRAM TRACING: CPT

## 2022-01-08 PROCEDURE — 87899 AGENT NOS ASSAY W/OPTIC: CPT | Performed by: STUDENT IN AN ORGANIZED HEALTH CARE EDUCATION/TRAINING PROGRAM

## 2022-01-08 PROCEDURE — 36415 COLL VENOUS BLD VENIPUNCTURE: CPT | Performed by: EMERGENCY MEDICINE

## 2022-01-08 PROCEDURE — 99223 1ST HOSP IP/OBS HIGH 75: CPT | Mod: AI | Performed by: STUDENT IN AN ORGANIZED HEALTH CARE EDUCATION/TRAINING PROGRAM

## 2022-01-08 PROCEDURE — 87636 SARSCOV2 & INF A&B AMP PRB: CPT | Performed by: EMERGENCY MEDICINE

## 2022-01-08 PROCEDURE — 83735 ASSAY OF MAGNESIUM: CPT | Performed by: PHYSICIAN ASSISTANT

## 2022-01-08 PROCEDURE — 96366 THER/PROPH/DIAG IV INF ADDON: CPT | Mod: 59

## 2022-01-08 PROCEDURE — 250N000013 HC RX MED GY IP 250 OP 250 PS 637: Performed by: EMERGENCY MEDICINE

## 2022-01-08 PROCEDURE — 120N000001 HC R&B MED SURG/OB

## 2022-01-08 PROCEDURE — 96376 TX/PRO/DX INJ SAME DRUG ADON: CPT | Mod: 59

## 2022-01-08 PROCEDURE — 99207 PR NO CHARGE LOS: CPT | Performed by: NURSE PRACTITIONER

## 2022-01-08 PROCEDURE — 99291 CRITICAL CARE FIRST HOUR: CPT | Mod: 25

## 2022-01-08 PROCEDURE — 84484 ASSAY OF TROPONIN QUANT: CPT | Performed by: EMERGENCY MEDICINE

## 2022-01-08 PROCEDURE — 96365 THER/PROPH/DIAG IV INF INIT: CPT | Mod: 59

## 2022-01-08 PROCEDURE — 250N000009 HC RX 250: Performed by: EMERGENCY MEDICINE

## 2022-01-08 PROCEDURE — 71275 CT ANGIOGRAPHY CHEST: CPT

## 2022-01-08 PROCEDURE — 87077 CULTURE AEROBIC IDENTIFY: CPT | Performed by: EMERGENCY MEDICINE

## 2022-01-08 RX ORDER — SODIUM CHLORIDE 9 MG/ML
INJECTION, SOLUTION INTRAVENOUS CONTINUOUS
Status: DISCONTINUED | OUTPATIENT
Start: 2022-01-08 | End: 2022-01-09

## 2022-01-08 RX ORDER — LIDOCAINE 40 MG/G
CREAM TOPICAL
Status: DISCONTINUED | OUTPATIENT
Start: 2022-01-08 | End: 2022-01-10 | Stop reason: HOSPADM

## 2022-01-08 RX ORDER — LIDOCAINE 40 MG/G
CREAM TOPICAL
Status: DISCONTINUED | OUTPATIENT
Start: 2022-01-08 | End: 2022-01-08

## 2022-01-08 RX ORDER — IOPAMIDOL 755 MG/ML
54 INJECTION, SOLUTION INTRAVASCULAR ONCE
Status: COMPLETED | OUTPATIENT
Start: 2022-01-08 | End: 2022-01-08

## 2022-01-08 RX ORDER — ACETAMINOPHEN 325 MG/1
975 TABLET ORAL EVERY 8 HOURS PRN
Status: DISCONTINUED | OUTPATIENT
Start: 2022-01-08 | End: 2022-01-10 | Stop reason: HOSPADM

## 2022-01-08 RX ORDER — ONDANSETRON 2 MG/ML
4 INJECTION INTRAMUSCULAR; INTRAVENOUS EVERY 6 HOURS PRN
Status: DISCONTINUED | OUTPATIENT
Start: 2022-01-08 | End: 2022-01-10 | Stop reason: HOSPADM

## 2022-01-08 RX ORDER — HEPARIN SODIUM 10000 [USP'U]/100ML
0-5000 INJECTION, SOLUTION INTRAVENOUS CONTINUOUS
Status: DISCONTINUED | OUTPATIENT
Start: 2022-01-08 | End: 2022-01-08

## 2022-01-08 RX ORDER — NITROGLYCERIN 0.4 MG/1
0.4 TABLET SUBLINGUAL EVERY 5 MIN PRN
Status: DISCONTINUED | OUTPATIENT
Start: 2022-01-08 | End: 2022-01-10 | Stop reason: HOSPADM

## 2022-01-08 RX ORDER — IPRATROPIUM BROMIDE AND ALBUTEROL SULFATE 2.5; .5 MG/3ML; MG/3ML
3 SOLUTION RESPIRATORY (INHALATION) EVERY 6 HOURS PRN
Status: DISCONTINUED | OUTPATIENT
Start: 2022-01-08 | End: 2022-01-10 | Stop reason: HOSPADM

## 2022-01-08 RX ORDER — ONDANSETRON 4 MG/1
4 TABLET, ORALLY DISINTEGRATING ORAL EVERY 6 HOURS PRN
Status: DISCONTINUED | OUTPATIENT
Start: 2022-01-08 | End: 2022-01-10 | Stop reason: HOSPADM

## 2022-01-08 RX ORDER — DILTIAZEM HYDROCHLORIDE 5 MG/ML
10 INJECTION INTRAVENOUS ONCE
Status: COMPLETED | OUTPATIENT
Start: 2022-01-08 | End: 2022-01-08

## 2022-01-08 RX ORDER — DILTIAZEM HCL IN NACL,ISO-OSM 125 MG/125
5-15 PLASTIC BAG, INJECTION (ML) INTRAVENOUS CONTINUOUS
Status: DISCONTINUED | OUTPATIENT
Start: 2022-01-08 | End: 2022-01-08

## 2022-01-08 RX ADMIN — HEPARIN SODIUM AND DEXTROSE 800 UNITS/HR: 10000; 5 INJECTION INTRAVENOUS at 12:38

## 2022-01-08 RX ADMIN — IOPAMIDOL 54 ML: 755 INJECTION, SOLUTION INTRAVENOUS at 11:10

## 2022-01-08 RX ADMIN — DILTIAZEM HYDROCHLORIDE 10 MG: 5 INJECTION, SOLUTION INTRAVENOUS at 12:08

## 2022-01-08 RX ADMIN — LEVOFLOXACIN 750 MG: 500 TABLET, FILM COATED ORAL at 12:23

## 2022-01-08 RX ADMIN — Medication 5 MG/HR: at 12:36

## 2022-01-08 ASSESSMENT — ACTIVITIES OF DAILY LIVING (ADL)
ADLS_ACUITY_SCORE: 12

## 2022-01-08 ASSESSMENT — ENCOUNTER SYMPTOMS
SHORTNESS OF BREATH: 1
COUGH: 1
FEVER: 0

## 2022-01-08 NOTE — PROGRESS NOTES
Chief Complaint   Patient presents with     Urgent Care     trouble breatrhing and chest tightness X 4 days.          ICD-10-CM    1. Hypoxia  R09.02    2. Shortness of breath  R06.02    3. COPD exacerbation (H)  J44.1    Declined ambulance transport, son will drive directly to [facility] emergency room.  [facility] emergency room is called, spoke with Carmina and they will be happy to see patient when she arrives.    Subjective     Abril Aguilar is an 86 year old female who presents to clinic today for shortness of breath.  She did have some nausea and body aches over the last week shortness of breath started 3 to 4 days ago and has become progressively worse with chest tightness.  She does have history of asthma/COPD and has been taking her albuterol inhaler without relief.      ROS: 10 point ROS neg other than the symptoms noted above in the HPI.       Objective    /76 (BP Location: Left arm, Patient Position: Sitting, Cuff Size: Adult Regular)   Pulse 105   Temp 98.4  F (36.9  C) (Tympanic)   Resp (!) 38   Wt 65.8 kg (145 lb)   SpO2 (!) 86%   BMI 28.32 kg/m    Nurses notes and VS have been reviewed.    Physical Exam       GENERAL APPEARANCE: alert and moderate distress     EYES: PERRL, EOMI, sclera non-icteric     HENT: oral exam benign, mucus membranes intact, without ulcers or lesions     NECK: no adenopathy or asymmetry, thyroid normal to palpation     RESP: Tachypneic, expiratory wheezes, crackles in the right lower lobe     CV: Regular rhythm, systolic murmur noted     MS: extremities normal- no gross deformities noted; normal muscle tone.     SKIN: no suspicious lesions or rashes     NEURO: Normal strength and tone, mentation intact and speech normal     PSYCH: normal thought process; no significant mood disturbance    Patient Instructions   Go directly to the emergency room for further assessment and treatment.      ROBYN Hess, CNP  [facility] Provider    The  use of Dragon/Salorixation services may have been used to construct the content in this note; any grammatical or spelling errors are non-intentional. Please contact the author of this note directly if you are in need of any clarification.

## 2022-01-08 NOTE — ED TRIAGE NOTES
Pt reports 3 days of shortness of breath. Pt states it began with vomiting and diarrhea. Pt states she has had 3 covid vaccinations. Pt has hx of asthma. Pt was potentially exposed to a covid positive person in [place] recently. 86% on room air. A & O x4

## 2022-01-08 NOTE — TELEPHONE ENCOUNTER
"  Current symptoms are:      Fever 100.1 (oral)    Cough    Shallowf breathing (has asthma using rescue inhaler Albuterol)    Generalized body aches ( back & hip)     Sore throat    Runny nose    Nausea    Diarrhea    Chest \"feels tight\"    Fatigue    Symptoms started on 1/4/2022.    Has been vaccinated against covid.   Per protocol to be assessed at urgent on Saturday.      Janet Alexander RN, MA  [facility] Triage Nurse Advisor        Reason for Disposition    [1] COVID-19 infection suspected by caller or triager AND [2] mild symptoms (cough, fever, or others) AND [3] negative COVID-19 rapid test    Additional Information    Negative: SEVERE difficulty breathing (e.g., struggling for each breath, speaks in single words)    Negative: Difficult to awaken or acting confused (e.g., disoriented, slurred speech)    Negative: Bluish (or gray) lips or face now    Negative: Shock suspected (e.g., cold/pale/clammy skin, too weak to stand, low BP, rapid pulse)    Negative: Sounds like a life-threatening emergency to the triager    Negative: SEVERE or constant chest pain or pressure (Exception: mild central chest pain, present only when coughing)    Negative: MODERATE difficulty breathing (e.g., speaks in phrases, SOB even at rest, pulse 100-120)    Negative: [1] Headache AND [2] stiff neck (can't touch chin to chest)    Negative: MILD difficulty breathing (e.g., minimal/no SOB at rest, SOB with walking, pulse <100)    Negative: Chest pain or pressure    Negative: Patient sounds very sick or weak to the triager    Negative: Fever > 103 F (39.4 C)    Negative: [1] Fever > 101 F (38.3 C) AND [2] age > 60 years    Negative: [1] Fever > 100.0 F (37.8 C) AND [2] bedridden (e.g., nursing home patient, CVA, chronic illness, recovering from surgery)    Negative: HIGH RISK for severe COVID complications (e.g., age > 64 years, obesity with BMI > 25, pregnant, chronic lung disease or other chronic medical condition)  (Exception: " Already seen by PCP and no new or worsening symptoms.)    Negative: [1] HIGH RISK patient AND [2] influenza is widespread in the community AND [3] ONE OR MORE respiratory symptoms: cough, sore throat, runny or stuffy nose    Negative: [1] HIGH RISK patient AND [2] influenza exposure within the last 7 days AND [3] ONE OR MORE respiratory symptoms: cough, sore throat, runny or stuffy nose    Protocols used: CORONAVIRUS (COVID-19) DIAGNOSED OR [id number] 8.25.2021

## 2022-01-08 NOTE — ED PROVIDER NOTES
History   Chief Complaint:  Shortness of Breath       HPI   Abril Aguilar is a 86 year old female with history of asthma who presents with shortness of breath since 3-4 days ago. The patient also states that she had some back and hip pain a couple of days ago but this has since subsided. She denies any fevers, nor any swelling in her ankles. She does endorse a cough with some phlegm but it is not overly productive.  She denies any other medical history other than macular degeneration for which she does not take any medications. She denies any history of surgeries on her heart or lungs.    She states that over the holidays she had traveled to [place] and was informed that a family member of a friend of her daughters was positive for COVID but the patient was not in contact with that person and the patient was not led to believe that her daughter's friend had any symptoms.    Review of Systems   Constitutional: Negative for fever.   Respiratory: Positive for cough and shortness of breath.    Cardiovascular: Negative for leg swelling.   All other systems reviewed and are negative.    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 Never smoker.    Physical Exam     Patient Vitals for the past 24 hrs:   BP Temp Temp src Pulse Resp SpO2   01/08/22 1150 -- -- -- 98 20 92 %   01/08/22 1145 (!) 141/84 -- -- 94 18 97 %   01/08/22 1130 (!) 141/75 -- -- 96 20 98 %   01/08/22 1100 132/76 --  -- 91 21 97 %   01/08/22 1045 122/87 -- -- 92 27 96 %   01/08/22 1040 -- -- -- 95 25 96 %   01/08/22 1030 135/79 -- -- 96 21 97 %   01/08/22 1015 (!) 153/79 -- -- 105 (!) 31 95 %   01/08/22 1013 136/75 97.9  F (36.6  C) Oral 103 24 (!) 86 %     Physical Exam  Constitutional: Vital signs reviewed as above.   Head: No external signs of trauma noted.  Eyes: Pupils are equal, round, and reactive to light.   Neck: No JVD noted  Cardiovascular: Tachycardic rate, regular rhythm and normal heart sounds.  No murmur heard. Equal B/L peripheral pulses.  Pulmonary/Chest: Minimal respiratory distress (nasal O2 in place). Patient has no wheezes. Patient has crackles in the B/L lower lung fields.   Gastrointestinal: Soft. There is no tenderness.   Musculoskeletal/Extremities: No edema noted. Normal tone.  Neurological: Patient is alert and oriented to person, place, and time.   Skin: Skin is warm and dry. There is no diaphoresis noted.   Psychiatric: The patient appears calm.    Emergency Department Course   ECG #1  ECG obtained at 1020, ECG read at 1024  Sinus tachycardia with occasional PVCs  Lateral infarct, age undetermined  Inferior infarct. age undetermined  Abnormal ECG    When compared with ECG dated 9/13/2014, there are PVCs present today.  There are also T wave inversions noted in aVL, aVF, and lead V3.    Rate 101 bpm. MN interval 158 ms. QRS duration 86 ms. QT/QTc 344/446 ms. P-R-T axes * 35 182.     ECG #2  ECG taken at 1202, ECG read at 1204  Atrial fibrillation with rapid ventricular response with premature ventricular or aberrantly conducted complexes  Nonspecific ST abnormality  Abnormal ECG   A fib with RVR as compared to prior (ECG #1 above).  Rate 156 bpm. MN interval * ms. QRS duration 84 ms. QT/QTc 288/464 ms. P-R-T axes * 35 30.    ECG#3  ECG taken at 1343, ECG read at 1345  Sinus tachycardia  Possible Left atrial enlargement  Borderline ECG   Sinus tachycardia as compared to prior (ECG #2 above)  Rate 104  bpm. OK interval 156 ms. QRS duration 78 ms. QT/QTc 340/447 ms. P-R-T axes 30 32 62.    Imaging:  CT Chest Pulmonary Embolism w Contrast:  1.  No evidence of pulmonary embolism.   2.  Bilateral, basilar predominant patchy consolidative pulmonary opacities, right worse than left, worrisome for infection.   3.  Hepatic steatosis.   Report per radiology    Laboratory:  Symptomatic Influenza A/B & SARS-CoV2 (COVID19) Virus PCR Multiplex: Negative    CBC: WBC 15.1 (H), HGB 13.1,     BMP: Sodium 126 (L), Chloride 93 (L), Glucose 104 (H), o/w WNL (Creatinine 0.69)      Troponin (Collected 1043): 8    Emergency Department Course:  Reviewed:  I reviewed nursing notes, vitals, past medical history and Care Everywhere    Assessments:   ED Course as of 01/08/22 1241   Sat Jan 08, 2022   1014 Dr. Riggins' evaluation   1143 Rechecked and updated   1156 D/W GABRIELA Villanueva, accepting for Dr. Goodman   1207 Rechecked and updated   1234 Updated Kay Ruffin PA-C on the patient's A-fib with RVR.      Interventions:  1208 Cardizem, 10 mg, IV  1223 Levaquin, 750 mg, PO  1236 Cardizem, 5 mg/hr, IV  1238 Heparin loading dose, 3950 units, IV  1238 Heparin, 800 units/hr, IV    Disposition:  The patient was admitted to the hospital under the care of Dr. Goodman.     Impression & Plan   Medical Decision Making:  This 86-year-old female patient presents to the ED due to shortness of breath.  Please see the HPI and exam for specifics.  The patient was found to be hypoxic on arrival.  This was improved with supplemental oxygen.  A broad differential was considered.  Fortunately, the patient is COVID-negative though she does have pneumonia noted on CT.  The patient also developed atrial fibrillation with rapid ventricular response while in the emergency department.    The patient was given antibiotics.  Levaquin was chosen given her allergy to penicillin and cephalosporins.  After being on diltiazem for period of time, the patient's rhythm  converted back to sinus.  She is also currently being maintained on heparin infusion.  The case was discussed with the hospitalist who agrees to admit the patient for ongoing monitoring and care.        Diagnosis:    ICD-10-CM    1. Community acquired pneumonia, unspecified laterality  J18.9    2. Pneumonia of both lower lobes due to infectious organism  J18.9    3. Hypoxia  R09.02    4. Atrial fibrillation with rapid ventricular response (H)  I48.91      Scribe Disclosure:  Claudia COOLEY, am serving as a scribe at 10:14 AM on 1/8/2022 to document services personally performed by Markus Riggins DO based on my observations and the provider's statements to me.      Markus Riggins DO  01/08/22 1408

## 2022-01-08 NOTE — H&P
History and Physical Exam     Abril Aguilar MRN# [id number]   YOB: 1935 Age: 86 year old      Date of Admission:  1/8/2022    Primary care provider: Julio Cesar Carrero   Abril Aguilar is a 86 year old female with a PMH significant for HLD, COPD and OA, who presented to the Emergency Room with complaints of increasing shortness of breath. The patient reports a cough for 1 week, she then developed generalized aches as well as n/v/d 3-4 days ago with increasing shortness of breath over the past 3-4 days as well. She has COPD and uses albuterol inhaler as needed, she did increase the use of this over the past few days with no help so she presented to the ER for eval.     Work up in the ED reveals: afebrile. She was noted to be hypoxic on arrival, was started on 4L oxygen and was able to wean down to 2L so far. BP has ranged from 92//79. Pulse was stable, but when patient was in the ER she was noted to go into a fib with RVR with a VR up to 164. BMP revealed Na of 126, otherwise within normal limits. CBC revealed wbc of 15.1, otherwise within normal limits. BC x 2 pending. COVID was negative. Chest CT showed bilateral basilar opacities, R>Lm concerning for infection. For the pneumonia, the patient was given PO Levaquin 750mg and supplemental oxygen. For the a fib - she was started on cardizem and heparin drips.     1. Bilateral Basilar Pneumonia - COVID negative. Patient with cough for 1 week with progressive shortness of breath in setting of underlying COPD. Rales on exam but minimal wheezing.   -- continue PTA advair inhaler  -- continue supplemental oxygen and attempt to wean when able  -- IV Levaquin  -- duonebs as needed for now    2. A Fib with RVR - no previous known history of a fib. She was asymptomatic during this episode. Suspect due to #1.   -- monitor on tele  -- obtain an ECHO  -- CHADS score is 3  -- can do event monitor on discharge and consider long term  anticoag if needed. Will hold off for now and monitor   -- check TSH, Mg     DVT prophylaxis: pt initiated on heparin gtts   Code Status - DNI, CPR is ok                Chief Complaint:   Shortness of breath          History of Present Illness:   Abril Aguilar is a 86 year old female with a PMH significant for HLD, COPD and OA, who presented to the Emergency Room with complaints of increasing shortness of breath. The patient reports a cough for 1 week, she then developed generalized aches as well as n/v/d 3-4 days ago with increasing shortness of breath over the past 3-4 days as well. She noted crackling noises in her chest when laying down to sleep, to the point it kept her awake at night. She has COPD and uses albuterol inhaler as needed, she did increase the use of this over the past few days with no help so she presented to the ER for eval.     Work up in the ED reveals: afebrile. She was noted to be hypoxic on arrival, was started on 4L oxygen and was able to wean down to 2L so far. BP has ranged from 92//79. Pulse was stable, but when patient was in the ER she was noted to go into a fib with RVR with a VR up to 164. BMP revealed Na of 126, otherwise within normal limits. CBC revealed wbc of 15.1, otherwise within normal limits. BC x 2 pending. COVID was negative. Chest CT showed bilateral basilar opacities, R>Lm concerning for infection. For the pneumonia, the patient was given PO Levaquin 750mg and supplemental oxygen. For the a fib - she was started on cardizem and heparin drips.    She denies any history of known a fib or any previous cardiac history. Denies any palpitations, fluttering, shortness of breath, chest pain, LE edema. She has DE [place] SANTOS at baseline but this has been stable. No previous cardiac imaging that she can recall.           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 Abstracted 02.     [facility] NONSPECIFIC PROCEDURE  1963    Excision Benign Left Breast Mass. Abstracted 02.     [facility] NONSPECIFIC PROCEDURE      Excision Left Posterior Arm Lipoma. Abstracted 02.            Social History:     Social History     Socioeconomic History     Marital status:      Spouse name: Not on file     Number of children: Not on file     Years of education: Not on file     Highest education level: Not on file   Occupational History     Occupation: Homemaker     Employer: RETIRED   Tobacco Use     Smoking status: Never Smoker     Smokeless tobacco: Never Used   Substance and Sexual Activity     Alcohol use: No     Drug use: No     Sexual activity: Never   Other Topics Concern     Parent/sibling w/ CABG, MI or angioplasty before 65F 55M? Not Asked   Social History Narrative     Not on file     Social Determinants of Health     Financial Resource Strain: Not on file   Food Insecurity: Not on file   Transportation Needs: Not on file   Physical Activity: Not on file   Stress: Not on file   Social Connections: Not on file   Intimate Partner Violence: Not on file   Housing Stability: Not on file               Family History:     Family History   Problem Relation Age of Onset     Hypertension Brother      Hypertension Sister         breast cancer, lipids     Cancer Brother         bladder     Lipids Sister      Heart Disease Mother         CHF;  at age 100     Family History Negative Father          at age 89; ?  cause              Allergies:      Allergies   Allergen Reactions     Atorvastatin      Hepatitis       Cephradine      Penicillins      Pravastatin      Muscle aches     Terbutaline Sulfate      brethaire               Medications:     Prior to Admission medications    Medication Sig Last Dose Taking? Auth Provider   Cholecalciferol (VITAMIN D) 2000 UNITS tablet Take 2,000 Units by mouth daily 1/8/2022 at Unknown time Yes Julio Cesar Carrero MD   fluticasone-salmeterol (ADVAIR-HFA) 115-21 MCG/ACT inhaler Inhale 2 puffs into the lungs 2 times daily 1/8/2022 at Unknown time Yes Julio Cesar Carrero MD   OCUVITE OR TABS 1 daily 1/8/2022 at Unknown time Yes               Review of Systems:   A Comprehensive greater than 10 system review of systems was carried out.  Pertinent positives and negatives are noted above.  Otherwise negative for contributory information.     CONSTITUTIONAL:NEGATIVE for fever, chills, change in weight  ENT/MOUTH: NEGATIVE for ear, mouth and throat problems  RESP:POSITIVE for cough-productive, dyspnea on exertion, Hx COPD and SOB/dyspnea and NEGATIVE for exposure to COVID, hemoptysis and wheezing  CV: NEGATIVE for chest pain, palpitations or peripheral edema  GI: POSITIVE for diarrhea, nausea and vomiting (all resolved after 24 hours) and NEGATIVE for abdominal pain generalized, hematemesis, hematochezia, melena and weight loss  MUSCULOSKELETAL: POSITIVE  for arthralgias for 1 day, now resolved and NEGATIVE for paresthesias  NEURO: NEGATIVE for weakness, dizziness or paresthesias         Physical Exam:   Blood pressure 113/66, pulse 105, temperature 97.9  F (36.6  C), temperature source Oral, resp. rate 26, SpO2 93 %, not currently breastfeeding.    GENERAL: healthy, alert and no distress  SPEECH: Patient's speech is normal.  EYES: Eyes grossly normal to inspection  HENT: ear canals- normal; TMs- normal; Nose- normal; Mouth- no ulcers, no lesions. Oral Mucosa-normal  NECK: no tenderness, no adenopathy, no  asymmetry, no masses, no stiffness; thyroid- normal to palpation  RESP: diffuse rales in sarah bases, no rhonchi, no wheezes  CV: regular rates and rhythm, normal S1 S2, no S3 or S4 and no murmur, click or rub  ABDOMEN: soft, no tenderness, no  hepatosplenomegaly, no masses, normal bowel sounds  MS: extremities- no gross deformities noted, no peripheral edema  SKIN: no suspicious lesions, no rashes  NEURO: Normal strength and tone, sensory exam grossly normal, mentation intact and speech normal  PSYCH: Alert and oriented times 3; coherent speech, normal rate and volume, able to articulate logical thoughts, able to abstract reason, no tangential thoughts, no hallucinations or delusions. Affect is normal.           Data:     EKG demonstrates:   ECG #1  ECG obtained at 1020, ECG read at 1024  Sinus tachycardia with occasional PVCs  Lateral infarct, age undetermined  Inferior infarct. age undetermined  Abnormal ECG     When compared with ECG dated 9/13/2014, there are PVCs present today.  There are also T wave inversions noted in aVL, aVF, and lead V3.    Rate 101 bpm. WY interval 158 ms. QRS duration 86 ms. QT/QTc 344/446 ms. P-R-T axes * 35 182.      ECG #2  ECG taken at 1202, ECG read at 1204  Atrial fibrillation with rapid ventricular response with premature ventricular or aberrantly conducted complexes  Nonspecific ST abnormality  Abnormal ECG   A fib with RVR as compared to prior (ECG #1 above).  Rate 156 bpm. WY interval * ms. QRS duration 84 ms. QT/QTc 288/464 ms. P-R-T axes * 35 30.     ECG#3  ECG taken at 1343, ECG read at 1345  Sinus tachycardia  Possible Left atrial enlargement  Borderline ECG   Sinus tachycardia as compared to prior (ECG #2 above)  Rate 104 bpm. WY interval 156 ms. QRS duration 78 ms. QT/QTc 340/447 ms. P-R-T axes 30 32 62.  No results for input(s): PH, PHV, PO2, PO2V, SAT, PCO2, PCO2V, HCO3, HCO3V in the last 168 hours.  Recent Labs   Lab 01/08/22  1043   WBC 15.1*   HGB 13.1   HCT 40.4    MCV 92             Lab Results   Component Value Date     01/08/2022     08/06/2020     05/02/2016     04/24/2015    Lab Results   Component Value Date    CHLORIDE 93 01/08/2022    CHLORIDE 103 08/06/2020    CHLORIDE 102 05/02/2016    CHLORIDE 102 04/24/2015    Lab Results   Component Value Date    BUN 13 01/08/2022    BUN 12 08/06/2020    BUN 12 05/02/2016    BUN 11 04/24/2015      Lab Results   Component Value Date    POTASSIUM 3.5 01/08/2022    POTASSIUM 4.6 08/06/2020    POTASSIUM 3.9 05/02/2016    POTASSIUM 4.5 04/24/2015    Lab Results   Component Value Date    CO2 22 01/08/2022    CO2 27 08/06/2020    CO2 29 05/02/2016    CO2 27 04/24/2015    Lab Results   Component Value Date    CR 0.69 01/08/2022    CR 0.80 08/06/2020    CR 0.75 05/02/2016    CR 0.90 04/24/2015        No results for input(s): CULT in the last 168 hours.  Recent Labs   Lab 01/08/22  1043   *   POTASSIUM 3.5   CHLORIDE 93*   CO2 22   ANIONGAP 11   *   BUN 13   CR 0.69   GFRESTIMATED 85   ELSIE 9.0     No results for input(s): NTBNPI, NTBNP in the last 168 hours.  No results for input(s): DD in the last 168 hours.  No results for input(s): SED, CRP in the last 168 hours.  No results for input(s): AST, ALT, GGT, ALKPHOS, BILITOTAL, BILICONJ, BILIDIRECT, YOLIS in the last 168 hours.    Invalid input(s): BILIRUBININDIRECT  No results for input(s): INR in the last 168 hours.  No results for input(s): LACT in the last 168 hours.  No results for input(s): LIPASE in the last 168 hours.  No results for input(s): TSH in the last 168 hours.  No results for input(s): TROPONIN, TROPI, TROPR in the last 168 hours.    Invalid input(s): TROP, TROPONINIES, TNIH  No results for input(s): COLOR, APPEARANCE, URINEGLC, URINEBILI, URINEKETONE, SG, UBLD, URINEPH, PROTEIN, UROBILINOGEN, NITRITE, LEUKEST, RBCU, WBCU in the last 168 hours.    Results for orders placed or performed during the hospital encounter of 01/08/22    CT Chest Pulmonary Embolism w Contrast    Narrative    EXAM: CT CHEST PULMONARY EMBOLISM WITH CONTRAST  LOCATION: [facility]  DATE/TIME: 01/08/2022, 11:06 AM    INDICATION: Shortness of breath, PE suspected, high probability.  COMPARISON: None available.  TECHNIQUE: CT chest pulmonary angiogram during arterial phase injection of IV contrast. Multiplanar reformats and MIP reconstructions were performed. Dose reduction techniques were used.   CONTRAST: 54 mL Isovue-370.    FINDINGS:  ANGIOGRAM CHEST: No evidence of pulmonary embolism.     LUNGS AND PLEURA: Trace left pleural effusion. No significant right pleural effusion. No significant pneumothorax. Bilateral basilar predominant, right worse than left, patchy consolidative pulmonary opacities, worrisome for infection.    MEDIASTINUM/AXILLAE: No cardiomegaly. Trace pericardial effusion. Multiple prominent mediastinal and hilar lymph nodes, indeterminate, could be reactive.    CORONARY ARTERY CALCIFICATION: Mild.    UPPER ABDOMEN: Limited evaluation of the upper abdomen due to lack of coverage and timing of contrast. Diffuse hypoattenuation of the liver, likely due to underlying hepatic steatosis. Diffuse thickening of the left adrenal gland without discrete nodule.    MUSCULOSKELETAL: Multilevel degenerative changes of the spine. No suspicious osseous lesion.      Impression    IMPRESSION:  1.  No evidence of pulmonary embolism.  2.  Bilateral, basilar predominant patchy consolidative pulmonary opacities, right worse than left, worrisome for infection.  3.  Hepatic steatosis.           Kay Ruffin PA-C

## 2022-01-08 NOTE — PHARMACY-ADMISSION MEDICATION HISTORY
Admission medication history interview status for this patient is complete. See [facility] admission navigator for allergy information, prior to admission medications and immunization status.     Medication history interview done, indicate source(s): Patient  Medication history resources (including written lists, pill bottles, clinic record): Salma and Care Everywhere  Pharmacy: [facility] in [place]    Changes made to PTA medication list:  Added: None  Changed: None  Reported as Not Taking: None  Removed: None    Actions taken by pharmacist (provider contacted, etc):None     Additional medication history information:None    Medication reconciliation/reorder completed by provider prior to medication history?  No    Prior to Admission medications    Medication Sig Last Dose Taking? Auth Provider   Cholecalciferol (VITAMIN D) 2000 UNITS tablet Take 2,000 Units by mouth daily 1/8/2022 at Unknown time Yes Julio Cesar Carrero MD   fluticasone-salmeterol (ADVAIR-HFA) 115-21 MCG/ACT inhaler Inhale 2 puffs into the lungs 2 times daily 1/8/2022 at Unknown time Yes Julio Cesar Carrero MD   OCUVITE OR TABS 1 daily 1/8/2022 at Unknown time Yes

## 2022-01-08 NOTE — ED NOTES
[facility]  ED Nurse Handoff Report    Abril Aguilar is a 86 year old female   ED Chief complaint: Shortness of Breath  . ED Diagnosis:   Final diagnoses:   Community acquired pneumonia, unspecified laterality   Pneumonia of both lower lobes due to infectious organism   Hypoxia   Atrial fibrillation with rapid ventricular response (H)     Allergies:   Allergies   Allergen Reactions     Atorvastatin      Hepatitis       Cephradine      Penicillins      Pravastatin      Muscle aches     Terbutaline Sulfate      brethaire       Code Status: Full Code  Activity level - Baseline/Home:  Independent. Activity Level - Current:   Stand by Assist. Lift room needed: No. Bariatric: No   Needed: No   Isolation: No. Infection: Not Applicable.     Vital Signs:   Vitals:    01/08/22 1325 01/08/22 1330 01/08/22 1335 01/08/22 1340   BP: 121/72 100/59 113/66    Pulse:  101 115 105   Resp: 26 29 25 26   Temp:       TempSrc:       SpO2: 92% 100%  93%       Cardiac Rhythm:  ,   Cardiac  Cardiac Rhythm: Atrial fibrillation  Pain level:    Patient confused: No. Patient Falls Risk: Yes.   Elimination Status: Has voided   Patient Report - Initial Complaint:Pt reports 3 days of shortness of breath. Pt states it began with vomiting and diarrhea. Pt states she has had 3 covid vaccinations. Pt has hx of asthma. Pt was potentially exposed to a covid positive person in [place] recently. 86% on room air. A & O x4 . Focused Assessment:     Cardiac - Cardiac WDL: .WDL except; rhythm (pt denies CP, endorses SOB. Pt tachy upon rooming.)  Cardiac Monitoring - EKG Monitoring: Yes  Cardiac Rhythm: ST   Cardiac Care - Chest Pain Intervention: cardiac biomarkers drawn; 12-lead ECG obtained; cardiac monitoring continued; cardiac monitor placed  [facility]     11:00 Respiratory Respiratory - Respiratory WDL: .WDL except; cough (pt presents with SOB worse with exertion. Upon rooming in ED pt Sa02 86%. 4L nasal cannula placed.  pt sats improved to 95%,hx. asthma) Breath Sounds:  (no wheezing upon auscultation of lung fields. ) Cough Frequency: frequent  Cough Type:  (pt reports coughing up phlegm occasionally)     Cardiac - Cardiac WDL: .WDL except; rhythm (pt presents with HR in 150s after using commode. EKG indicates pt is now in afib, Pt denies palpitations)  Cardiac Monitoring - EKG Monitoring: Yes  Cardiac Regularity: Irregular  Cardiac Rhythm: Atrial fibrillation   Cardiac Care - Chest Pain Intervention: 12-lead ECG obtained; cardiac monitoring continued     Tests Performed: labs and imaging. Abnormal Results:   Labs Ordered and Resulted from Time of ED Arrival to Time of ED Departure   BASIC METABOLIC PANEL - Abnormal       Result Value    Sodium 126 (*)     Potassium 3.5      Chloride 93 (*)     Carbon Dioxide (CO2) 22      Anion Gap 11      Urea Nitrogen 13      Creatinine 0.69      Calcium 9.0      Glucose 104 (*)     GFR Estimate 85     CBC WITH PLATELETS AND DIFFERENTIAL - Abnormal    WBC Count 15.1 (*)     RBC Count 4.38      Hemoglobin 13.1      Hematocrit 40.4      MCV 92      MCH 29.9      MCHC 32.4      RDW 13.2      Platelet Count 198      % Neutrophils 93      % Lymphocytes 3      % Monocytes 4      % Eosinophils 0      % Basophils 0      % Immature Granulocytes 0      NRBCs per 100 WBC 0      Absolute Neutrophils 14.1 (*)     Absolute Lymphocytes 0.5 (*)     Absolute Monocytes 0.5      Absolute Eosinophils 0.0      Absolute Basophils 0.0      Absolute Immature Granulocytes 0.1      Absolute NRBCs 0.0     INFLUENZA A/B & SARS-COV2 PCR MULTIPLEX - Normal    Influenza A PCR Negative      Influenza B PCR Negative      SARS CoV2 PCR Negative     TROPONIN I - Normal    Troponin I High Sensitivity 8     BLOOD CULTURE   BLOOD CULTURE     CT Chest Pulmonary Embolism w Contrast   Final Result   IMPRESSION:   1.  No evidence of pulmonary embolism.   2.  Bilateral, basilar predominant patchy consolidative pulmonary opacities, right  worse than left, worrisome for infection.   3.  Hepatic steatosis.           .   Treatments provided: see MAR  Family Comments: no family at bedside  OBS brochure/video discussed/provided to patient:  N/A  ED Medications:   Medications   diltiazem (CARDIZEM) 125 mg in sodium chloride 0.7 % 125 mL infusion (5 mg/hr Intravenous New Bag 1/8/22 1236)   heparin infusion 25,000 units in D5W 250 mL ANTICOAGULANT (800 Units/hr Intravenous New Bag 1/8/22 1238)   sodium chloride (PF) 0.9% PF flush 75 mL (75 mLs Intravenous Given 1/8/22 1111)   iopamidol (ISOVUE-370) solution 54 mL (54 mLs Intravenous Given 1/8/22 1110)   levofloxacin (LEVAQUIN) tablet 750 mg (750 mg Oral Given 1/8/22 1223)   diltiazem (CARDIZEM) injection 10 mg (10 mg Intravenous Given 1/8/22 1208)   heparin loading dose for LOW INTENSITY TREATMENT * Give BEFORE starting heparin infusion (3,950 Units Intravenous Given 1/8/22 1238)     Drips infusing:  Yes, heparin and cardizem  For the majority of the shift, the patient's behavior Green. Interventions performed were N/A.    Sepsis treatment initiated: No     Patient tested for COVID 19 prior to admission: YES    ED Nurse Name/Phone Number: Eleonora Aquino RN,   1:44 PM    RECEIVING UNIT ED HANDOFF REVIEW    Above ED Nurse Handoff Report was reviewed: Yes  Reviewed by: Ramon Thompson RN on January 9, 2022 at 7:36 AM

## 2022-01-08 NOTE — ED NOTES
Bed: ED39  Expected date: 1/8/22  Expected time:   Means of arrival: Ambulance  Comments:  [facility]

## 2022-01-09 ENCOUNTER — APPOINTMENT (OUTPATIENT)
Dept: CARDIOLOGY | Facility: CLINIC | Age: 87
DRG: 177 | End: 2022-01-09
Attending: PHYSICIAN ASSISTANT
Payer: MEDICARE

## 2022-01-09 LAB
ANION GAP SERPL CALCULATED.3IONS-SCNC: 8 MMOL/L (ref 3–14)
BUN SERPL-MCNC: 13 MG/DL (ref 7–30)
CALCIUM SERPL-MCNC: 9 MG/DL (ref 8.5–10.1)
CHLORIDE BLD-SCNC: 99 MMOL/L (ref 94–109)
CO2 SERPL-SCNC: 24 MMOL/L (ref 20–32)
CREAT SERPL-MCNC: 0.61 MG/DL (ref 0.52–1.04)
ENTEROCOCCUS FAECALIS: NOT DETECTED
ENTEROCOCCUS FAECIUM: NOT DETECTED
ERYTHROCYTE [DISTWIDTH] IN BLOOD BY AUTOMATED COUNT: 13.5 % (ref 10–15)
GFR SERPL CREATININE-BSD FRML MDRD: 88 ML/MIN/1.73M2
GLUCOSE BLD-MCNC: 134 MG/DL (ref 70–99)
HCT VFR BLD AUTO: 38 % (ref 35–47)
HGB BLD-MCNC: 12.5 G/DL (ref 11.7–15.7)
LISTERIA SPECIES (DETECTED/NOT DETECTED): NOT DETECTED
LVEF ECHO: NORMAL
MCH RBC QN AUTO: 30 PG (ref 26.5–33)
MCHC RBC AUTO-ENTMCNC: 32.9 G/DL (ref 31.5–36.5)
MCV RBC AUTO: 91 FL (ref 78–100)
PLATELET # BLD AUTO: 237 10E3/UL (ref 150–450)
POTASSIUM BLD-SCNC: 3.5 MMOL/L (ref 3.4–5.3)
RBC # BLD AUTO: 4.16 10E6/UL (ref 3.8–5.2)
SODIUM SERPL-SCNC: 131 MMOL/L (ref 133–144)
STAPHYLOCOCCUS AUREUS: NOT DETECTED
STAPHYLOCOCCUS EPIDERMIDIS: DETECTED
STAPHYLOCOCCUS LUGDUNENSIS: NOT DETECTED
STREPTOCOCCUS AGALACTIAE: NOT DETECTED
STREPTOCOCCUS ANGINOSUS GROUP: NOT DETECTED
STREPTOCOCCUS PNEUMONIAE: NOT DETECTED
STREPTOCOCCUS PYOGENES: NOT DETECTED
STREPTOCOCCUS SPECIES: NOT DETECTED
WBC # BLD AUTO: 13.9 10E3/UL (ref 4–11)

## 2022-01-09 PROCEDURE — 93306 TTE W/DOPPLER COMPLETE: CPT | Mod: 26 | Performed by: INTERNAL MEDICINE

## 2022-01-09 PROCEDURE — 120N000001 HC R&B MED SURG/OB

## 2022-01-09 PROCEDURE — 99232 SBSQ HOSP IP/OBS MODERATE 35: CPT | Performed by: HOSPITALIST

## 2022-01-09 PROCEDURE — 99207 PR CDG-MDM COMPONENT: MEETS LOW - DOWN CODED: CPT | Performed by: HOSPITALIST

## 2022-01-09 PROCEDURE — 36415 COLL VENOUS BLD VENIPUNCTURE: CPT | Performed by: HOSPITALIST

## 2022-01-09 PROCEDURE — 258N000003 HC RX IP 258 OP 636: Performed by: STUDENT IN AN ORGANIZED HEALTH CARE EDUCATION/TRAINING PROGRAM

## 2022-01-09 PROCEDURE — 93306 TTE W/DOPPLER COMPLETE: CPT

## 2022-01-09 PROCEDURE — 250N000011 HC RX IP 250 OP 636: Performed by: STUDENT IN AN ORGANIZED HEALTH CARE EDUCATION/TRAINING PROGRAM

## 2022-01-09 PROCEDURE — 250N000013 HC RX MED GY IP 250 OP 250 PS 637: Performed by: PHYSICIAN ASSISTANT

## 2022-01-09 PROCEDURE — 250N000013 HC RX MED GY IP 250 OP 250 PS 637: Performed by: HOSPITALIST

## 2022-01-09 PROCEDURE — 250N000011 HC RX IP 250 OP 636: Performed by: HOSPITALIST

## 2022-01-09 PROCEDURE — 258N000003 HC RX IP 258 OP 636: Performed by: PHYSICIAN ASSISTANT

## 2022-01-09 PROCEDURE — 85027 COMPLETE CBC AUTOMATED: CPT | Performed by: HOSPITALIST

## 2022-01-09 PROCEDURE — 82310 ASSAY OF CALCIUM: CPT | Performed by: HOSPITALIST

## 2022-01-09 RX ORDER — LANOLIN ALCOHOL/MO/W.PET/CERES
3 CREAM (GRAM) TOPICAL
Status: DISCONTINUED | OUTPATIENT
Start: 2022-01-09 | End: 2022-01-10 | Stop reason: HOSPADM

## 2022-01-09 RX ORDER — FLUTICASONE PROPIONATE AND SALMETEROL XINAFOATE 115; 21 UG/1; UG/1
2 AEROSOL, METERED RESPIRATORY (INHALATION) 2 TIMES DAILY
Status: DISCONTINUED | OUTPATIENT
Start: 2022-01-09 | End: 2022-01-10 | Stop reason: HOSPADM

## 2022-01-09 RX ADMIN — ENOXAPARIN SODIUM 40 MG: 40 INJECTION SUBCUTANEOUS at 14:20

## 2022-01-09 RX ADMIN — VANCOMYCIN HYDROCHLORIDE 1500 MG: 10 INJECTION, POWDER, LYOPHILIZED, FOR SOLUTION INTRAVENOUS at 15:23

## 2022-01-09 RX ADMIN — Medication 3 MG: at 21:27

## 2022-01-09 RX ADMIN — LEVOFLOXACIN 750 MG: 500 TABLET, FILM COATED ORAL at 07:27

## 2022-01-09 RX ADMIN — FLUTICASONE PROPIONATE AND SALMETEROL XINAFOATE 2 PUFF: 115; 21 AEROSOL, METERED RESPIRATORY (INHALATION) at 21:28

## 2022-01-09 RX ADMIN — FLUTICASONE PROPIONATE AND SALMETEROL XINAFOATE 2 PUFF: 115; 21 AEROSOL, METERED RESPIRATORY (INHALATION) at 12:23

## 2022-01-09 ASSESSMENT — ACTIVITIES OF DAILY LIVING (ADL)
ADLS_ACUITY_SCORE: 5
ADLS_ACUITY_SCORE: 5
ADLS_ACUITY_SCORE: 12
ADLS_ACUITY_SCORE: 5
ADLS_ACUITY_SCORE: 12
ADLS_ACUITY_SCORE: 5
ADLS_ACUITY_SCORE: 12
ADLS_ACUITY_SCORE: 12
ADLS_ACUITY_SCORE: 5
ADLS_ACUITY_SCORE: 12
ADLS_ACUITY_SCORE: 5
ADLS_ACUITY_SCORE: 5
ADLS_ACUITY_SCORE: 12
ADLS_ACUITY_SCORE: 12
ADLS_ACUITY_SCORE: 5

## 2022-01-09 NOTE — PHARMACY-AMINOGLYCOSIDE DOSING SERVICE
Pharmacy Vancomycin Initial Note  Date of Service 2022  Patient's  1935  86 year old, female    Indication: Bacteremia    Current estimated CrCl = Estimated Creatinine Clearance: 58.1 mL/min (based on SCr of 0.61 mg/dL).    Creatinine for last 3 days  2022: 10:43 AM Creatinine 0.69 mg/dL  2022: 11:42 AM Creatinine 0.61 mg/dL    Recent Vancomycin Level(s) for last 3 days  No results found for requested labs within last 72 hours.      Vancomycin IV Administrations (past 72 hours)      No vancomycin orders with administrations in past 72 hours.                Nephrotoxins and other renal medications (From now, onward)    Start     Dose/Rate Route Frequency Ordered Stop    01/10/22 0300  vancomycin (VANCOCIN) 750 mg in sodium chloride 0.9 % 250 mL intermittent infusion         750 mg  over 60-90 Minutes Intravenous EVERY 12 HOURS 22 1447      22 1500  vancomycin 1500 mg in 0.9% NaCl 250 ml intermittent infusion 1,500 mg         1,500 mg  over 90 Minutes Intravenous ONCE 22 1446            Contrast Orders - past 72 hours (72h ago, onward)            Start     Dose/Rate Route Frequency Ordered Stop    22 1110  iopamidol (ISOVUE-370) solution 54 mL         54 mL Intravenous ONCE 22 1107 22 1110          [employer] Prediction of Planned Initial Vancomycin Regimen  Loading dose: 1500 mg at 15:00 2022.  Regimen: 750 mg IV every 12 hours.  Start time: 14:48 on 2022  Exposure target: AUC24 (range)400-600 mg/L.hr   AUC24,ss: 496 mg/L.hr  Probability of AUC24 > 400: 73 %  Ctrough,ss: 16.1 mg/L  Probability of Ctrough,ss > 20: 31 %  Probability of nephrotoxicity (Lodise EVELIN ): 11 %          Plan:  1. Start vancomycin 1500 mg IV once as loading dose then 750 mg q12h.   2. Vancomycin monitoring method: AUC  3. Vancomycin therapeutic monitoring goal: 400-600 mg*h/L  4. Pharmacy will check vancomycin levels as appropriate in 1-3 Days.    5. Serum creatinine  levels will be ordered daily for the first week of therapy and at least twice weekly for subsequent weeks.      Sherry Aviles RPH

## 2022-01-09 NOTE — PROVIDER NOTIFICATION
DATE:  1/9/2022   TIME OF RECEIPT FROM LAB:  1707  LAB TEST: Blood culture from 01/08/2022 at 1211  LAB VALUE:  Methicillin Resistant Staphylococcus Epidermidis  RESULTS GIVEN WITH READ-BACK TO (PROVIDER):  Dr. Fletcher  TIME LAB VALUE REPORTED TO PROVIDER:   2846

## 2022-01-09 NOTE — PLAN OF CARE
Admitted for pneumonia. Now on room air. Up independently. Continues on Levaquin. Anticipate discharge home tomorrow.

## 2022-01-09 NOTE — PROVIDER NOTIFICATION
DATE:  1/9/2022   TIME OF RECEIPT FROM LAB:  2462  LAB TEST:  Blood culture  LAB VALUE:  Gm + cocci  RESULTS GIVEN WITH READ-BACK TO (PROVIDER): Dr Fletcher messaged  TIME LAB VALUE REPORTED TO PROVIDER:   5523

## 2022-01-09 NOTE — PROGRESS NOTES
[facility] Progress Note    Brief  visit at staff request.  Patient and I had a reflective conversation related to patient's fears at feeling seriously ill a few days ago.    Patient has pneumonia, but is relieved that she doesn't have COVID.    She identifies as Mandaen and looks to her matthias as a resource.    She said she feels much better than she did a few days ago, and looks forward to going home; hopefully tomorrow.        Rev. Jayla Greenfield M.Div.  Staff   Phone  [phone]

## 2022-01-10 ENCOUNTER — APPOINTMENT (OUTPATIENT)
Dept: CARDIOLOGY | Facility: CLINIC | Age: 87
DRG: 177 | End: 2022-01-10
Attending: HOSPITALIST
Payer: MEDICARE

## 2022-01-10 VITALS
OXYGEN SATURATION: 92 % | TEMPERATURE: 97.4 F | SYSTOLIC BLOOD PRESSURE: 154 MMHG | DIASTOLIC BLOOD PRESSURE: 71 MMHG | RESPIRATION RATE: 20 BRPM | HEART RATE: 86 BPM

## 2022-01-10 LAB
ATRIAL RATE - MUSE: 101 BPM
ATRIAL RATE - MUSE: 104 BPM
ATRIAL RATE - MUSE: 153 BPM
DIASTOLIC BLOOD PRESSURE - MUSE: NORMAL MMHG
INTERPRETATION ECG - MUSE: NORMAL
P AXIS - MUSE: 30 DEGREES
P AXIS - MUSE: NORMAL DEGREES
P AXIS - MUSE: NORMAL DEGREES
PR INTERVAL - MUSE: 156 MS
PR INTERVAL - MUSE: 158 MS
PR INTERVAL - MUSE: NORMAL MS
QRS DURATION - MUSE: 78 MS
QRS DURATION - MUSE: 84 MS
QRS DURATION - MUSE: 86 MS
QT - MUSE: 288 MS
QT - MUSE: 340 MS
QT - MUSE: 344 MS
QTC - MUSE: 446 MS
QTC - MUSE: 447 MS
QTC - MUSE: 464 MS
R AXIS - MUSE: 32 DEGREES
R AXIS - MUSE: 35 DEGREES
R AXIS - MUSE: 35 DEGREES
SYSTOLIC BLOOD PRESSURE - MUSE: NORMAL MMHG
T AXIS - MUSE: 182 DEGREES
T AXIS - MUSE: 30 DEGREES
T AXIS - MUSE: 62 DEGREES
VENTRICULAR RATE- MUSE: 101 BPM
VENTRICULAR RATE- MUSE: 104 BPM
VENTRICULAR RATE- MUSE: 156 BPM

## 2022-01-10 PROCEDURE — 250N000013 HC RX MED GY IP 250 OP 250 PS 637: Performed by: PHYSICIAN ASSISTANT

## 2022-01-10 PROCEDURE — 93272 ECG/REVIEW INTERPRET ONLY: CPT | Performed by: INTERNAL MEDICINE

## 2022-01-10 PROCEDURE — 93270 REMOTE 30 DAY ECG REV/REPORT: CPT

## 2022-01-10 PROCEDURE — 999N000157 HC STATISTIC RCP TIME EA 10 MIN

## 2022-01-10 PROCEDURE — 99239 HOSP IP/OBS DSCHRG MGMT >30: CPT | Performed by: HOSPITALIST

## 2022-01-10 RX ORDER — LEVOFLOXACIN 750 MG/1
750 TABLET, FILM COATED ORAL DAILY
Qty: 5 TABLET | Refills: 0 | Status: SHIPPED | OUTPATIENT
Start: 2022-01-10 | End: 2022-01-15

## 2022-01-10 RX ADMIN — FLUTICASONE PROPIONATE AND SALMETEROL XINAFOATE 2 PUFF: 115; 21 AEROSOL, METERED RESPIRATORY (INHALATION) at 07:43

## 2022-01-10 RX ADMIN — LEVOFLOXACIN 750 MG: 500 TABLET, FILM COATED ORAL at 08:48

## 2022-01-10 ASSESSMENT — ACTIVITIES OF DAILY LIVING (ADL)
ADLS_ACUITY_SCORE: 5

## 2022-01-10 NOTE — DISCHARGE SUMMARY
[facility]    Discharge Summary  Hospitalist    Date of Admission:  1/8/2022  Date of Discharge:  1/10/2022  Discharging Provider: Juan J Fletcher MD  Date of Service (when I saw the patient): 01/10/22    Discharge Diagnoses   #Acute hypoxemic respiratory failure secondary to community-acquired pneumonia versus aspiration pneumonia  #Atrial fibrillation with RVR- now in normal sinus rhythm  #Hyponatremia-improved    Hospital Course   Abril Aguilar is a 86 year old female with a PMH significant for HLD, COPD and OA, who presented to the Emergency Room with complaints of increasing shortness of breath.     #Acute hypoxemic respiratory failure secondary to CAP vs. Aspiration pna: Ms. Aguilar describes several days of body aches and nausea with nonbloody emesis.  She is also had a cough.  No chest pain.  No wheezing.  He has used her albuterol inhaler without much improvement.  ER work-up notable for patient being afebrile but hypoxemic initially requiring 4 L oxygen via nasal cannula.  She was initially in A. fib with RVR and started on diltiazem but converted to sinus rhythm thereafter.  Labs notable for leukocytosis to 15 with Covid-19 test negative.  CT shows bibasilar opacities.  -Patient was treated with IV Levaquin.  She was able to be weaned off oxygen on 1/9.  She felt symptomatically better and voiced readiness for discharge home.  She will complete a 7-day course of Levaquin on discharge.  Return instructions provided.     #Positive blood culture, staph epi: 1/2 of the patient's blood cultures came back positive for gram-positive cocci in clusters, subsequently came back positive for staph epidermis.  Likely contaminant.       #Afib w RVR, now in sinus rhythm: No known history of atrial fibrillation.  Asymptomatic.  Converted to sinus rhythm in the ER.    TTE without major abnormality.  Her EF was noted to be 60-65%.  No regional wall motion abnormalities.  She was prescribed an event  monitor on discharge.  Holding off on anticoagulation given this is likely driven by her underlying infection.  She should follow-up with her PCP.     #Hyponatremia: Mild. Likely driven by n/v.  Received NS overnight. Stop for now. Follow up BMP.     Juan J Fletcher MD    Pending Results   These results will be followed up by Hospital medicine team  Unresulted Labs Ordered in the Past 30 Days of this Admission     Date and Time Order Name Status Description    1/8/2022 12:12 PM Blood Culture Arm, Left Preliminary     1/8/2022 11:42 AM Blood Culture Arm, Right Preliminary           Code Status   Full Code       Primary Care Physician   Julio Cesar Carrero    Physical Exam   Temp: 97.9  F (36.6  C) Temp src: Oral BP: (!) 147/73 Pulse: 94   Resp: 18 SpO2: 93 % O2 Device: None (Room air) Oxygen Delivery: 2 LPM  There were no vitals filed for this visit.  Vital Signs with Ranges  Temp:  [97.1  F (36.2  C)-98  F (36.7  C)] 97.9  F (36.6  C)  Pulse:  [76-95] 94  Resp:  [18-23] 18  BP: (119-147)/(59-73) 147/73  SpO2:  [92 %-96 %] 93 %  I/O last 3 completed shifts:  In: 1640 [P.O.:1640]  Out: -     Constitutional: Nontoxic, NAD  HEENT: Normocephalic. MMM, No elevation of JVD noted.   Respiratory: Nl WOB, Clear bilaterally, Mild rhonchi in bases. Conversational. No increased WOB.   Cardiovascular: Regular, no murmur  GI: BS+, NT, ND  Skin/Integumen: WWP, no rash. No edema  Neuro: CNII-XII intact. Moves all extremities. No tremor. A&Ox3.    Discharge Disposition   Discharged to home  Condition at discharge: Stable    Consultations This Hospital Stay   PHARMACY IP CONSULT  PHARMACY IP CONSULT  PHARMACY TO DOSE VANCO    Time Spent on this Encounter   I, Juan J Fletcher MD, personally saw the patient today and spent greater than 30 minutes discharging this patient.    Discharge Orders      Reason for your hospital stay    You were hospitalized for pneumonia.  You were treated with antibiotics.  You were able to be weaned off of oxygen.  You  felt symptomatically better.  You were also found to be in atrial fibrillation in the ER but converted to normal heart rhythm fairly quickly.  You will go home with a heart monitor.  You should follow-up with your primary care physician in the next week.     Follow-up and recommended labs and tests     Follow up with primary care provider, Julio Cesar Carrero, within 7 days for hospital follow- up.  The following labs/tests are recommended: CBC and BMP.     Activity    Your activity upon discharge: activity as tolerated     Diet    Follow this diet upon discharge: Orders Placed This Encounter      Combination Diet Regular Diet Adult     Discharge Medications   Current Discharge Medication List      START taking these medications    Details   levofloxacin (LEVAQUIN) 750 MG tablet Take 1 tablet (750 mg) by mouth daily for 5 days  Qty: 5 tablet, Refills: 0    Associated Diagnoses: Community acquired pneumonia, unspecified laterality         CONTINUE these medications which have NOT CHANGED    Details   Cholecalciferol (VITAMIN D) 2000 UNITS tablet Take 2,000 Units by mouth daily  Qty: 100 tablet, Refills: 3    Associated Diagnoses: Hyponatremia; Hepatitis; Vitamin D deficiency      fluticasone-salmeterol (ADVAIR-HFA) 115-21 MCG/ACT inhaler Inhale 2 puffs into the lungs 2 times daily  Qty:        OCUVITE OR TABS 1 daily  Refills: 0           Allergies   Allergies   Allergen Reactions     Atorvastatin      Hepatitis       Cephradine      Penicillins      Pravastatin      Muscle aches     Terbutaline Sulfate      brethaire     Data   Most Recent 3 CBC's:Recent Labs   Lab Test 01/09/22  1142 01/08/22  1043 09/13/14  1635   WBC 13.9* 15.1* 8.0   HGB 12.5 13.1 13.2   MCV 91 92 90    198 245      Most Recent 3 BMP's:  Recent Labs   Lab Test 01/09/22  1142 01/08/22  1043 08/06/20  1020   * 126* 135   POTASSIUM 3.5 3.5 4.6   CHLORIDE 99 93* 103   CO2 24 22 27   BUN 13 13 12   CR 0.61 0.69 0.80   ANIONGAP 8 11 5   ELSIE 9.0  9.0 9.0   * 104* 95     Most Recent 2 LFT's:  Recent Labs   Lab Test 04/24/15  0838 12/05/14  1059   AST 20 28   ALT 31 42   ALKPHOS 70 65   BILITOTAL 0.5 0.4     Most Recent INR's and Anticoagulation Dosing History:  Anticoagulation Dose History    There is no flowsheet data to display.       Most Recent 3 Troponin's:  Recent Labs   Lab Test 09/13/14  1635   TROPI <0.015  The 99th percentile for upper reference range is 0.045 ug/L.  Troponin values in   the range of 0.045 - 0.120 ug/L may be associated with risks of adverse   clinical events.   Effective 7/30/2014, the reference range for this assay has changed to reflect   new instrumentation/methodology.       Most Recent Cholesterol Panel:  Recent Labs   Lab Test 08/06/20  1020   CHOL 266*   *   HDL 78   TRIG 97     Most Recent 6 Bacteria Isolates From Any Culture (See EPIC Reports for Culture Details):  Recent Labs   Lab Test 08/20/14  1428 07/12/14  0934 06/14/14  1123 06/09/14  0722   CULT >100,000 colonies/mL Escherichia coli  10,000 to 50,000 colonies/mL mixed urogenital jenn  * 50,000 to 100,000 colonies/mL Mixed gram negative and positive jenn  Multiple species present, probable perineal contamination.  Susceptibility testing not routinely done   >100,000 colonies/mL Escherichia coli* 50,000 to 100,000 colonies/mL Escherichia coli*     Most Recent TSH, T4 and A1c Labs:  Recent Labs   Lab Test 01/08/22  1043 09/16/14  1118   TSH 0.61  --    T4  --  1.38

## 2022-01-10 NOTE — PLAN OF CARE
Admitting Diagnosis: PNA  Pertinent History:HLD, COPD, OA.  For vitals and assessment please see flow sheet.   Living Situation: Home   Pain plan: denies pain.   Mobility: independent  Baseline activity: Independent.   Alarms/Safety: Steady gait.   LDA's: PIV.   Pertinent test results:K+ 3.5, CR: 0.61.  Consults: none  Abnormals/Pending: none  Other Cares/Comments:A & O x4, VSS, LS diminished. 02 94% RA  Discharge Disposition: discharge home Possible tomorrow.   Discharge Time: TBD.

## 2022-01-10 NOTE — PROGRESS NOTES
Care Management Discharge Note    Discharge Date: 01/10/2022       Discharge Disposition:  home    Additional Information:  Pt identified as a Service Bundle #2. No needs or assessment needed at this time. Please consult CM/SW  if discharge needs should arise.      Shira Le RN BSN CM  Inpatient Care Coordination  [facility]  [phone]

## 2022-01-10 NOTE — PROGRESS NOTES
BP (!) 154/71 (BP Location: Right arm)   Pulse 86   Temp 97.4  F (36.3  C) (Oral)   Resp 20   SpO2 92%       AVS reviewed with patient. Patient is in stable condition, VSS, no co pain/cp/sob. All discharge education reviewed with pt in regards to: diet, activity, safety, s/s to report, medications and rx, follow up appointments/care.  Will go home with cardiac event monitor and PO Levaquin. All questions answered. Pt denies any further questions or concerns. PIV removed. No complications. Telemetry monitor removed. All belongings returned. Pt escorted to front door by [place] staff.

## 2022-01-10 NOTE — PLAN OF CARE
VSS. A/O x4. Independent reg diet. No Skin breakdown. PIV SL WDL. R/A. Tele: SR. Plan for possible discharge today.

## 2022-01-11 ENCOUNTER — PATIENT OUTREACH (OUTPATIENT)
Dept: CARE COORDINATION | Facility: CLINIC | Age: 87
End: 2022-01-11
Payer: COMMERCIAL

## 2022-01-11 DIAGNOSIS — Z71.89 OTHER SPECIFIED COUNSELING: ICD-10-CM

## 2022-01-11 LAB
BACTERIA BLD CULT: ABNORMAL
BACTERIA BLD CULT: ABNORMAL

## 2022-01-11 NOTE — PROGRESS NOTES
Clinic Care Coordination Contact  [facility]/Voicemail       Clinical Data: Care Coordinator Outreach  Outreach attempted x 1.  Left message on patient's voicemail with call back information and requested return call.  Plan:  Care Coordinator will try to reach patient again in 1-2 business days.    Minerva Villa  Care Transitions Assistant  [facility]

## 2022-01-12 NOTE — PROGRESS NOTES
Clinic Care Coordination Contact    Background: Care Coordination referral placed from [facility] discharge report for reason of patient meeting criteria for a TCM outreach call by [facility] team.    Assessment: Upon chart review, [facility] Team member will cancel/close the referral for TCM outreach due to reason below:    Patient isn't interested in speaking with care team today and disconnected call.      Plan: Care Coordination referral for TCM outreach canceled.  I had left a detailed message on Abril's machine after my second attempt to speak to her, Abril then left a message on my VM I returned her call, she explained that she appreciated the call but was not going to change her appointment date with her PCP any sooner I did let Abril know that the discharging Doctor did want her to have her blood drawn within 7 days, she expressed that she would maybe call and get the labs drawn but again was not changing her appointment date. She thanked me for my time and said goodbye. I was unable to complete all of the TCM questions.    Bita Villa MA  [facility], [facility]

## 2022-01-12 NOTE — PROGRESS NOTES
Clinic Care Coordination Contact  [facility]/Voicemail       Clinical Data: Care Coordinator Outreach  Outreach attempted x 2.  Left message on patient's voicemail with call back information and requested return call.  Plan: Care Coordinator will do no further outreaches at this time.    Minerva Villa  Care Transitions Assistant  [facility]

## 2022-01-13 LAB — BACTERIA BLD CULT: NO GROWTH

## 2022-02-07 ENCOUNTER — OFFICE VISIT (OUTPATIENT)
Dept: INTERNAL MEDICINE | Facility: CLINIC | Age: 87
End: 2022-02-07
Payer: MEDICARE

## 2022-02-07 ENCOUNTER — ANCILLARY PROCEDURE (OUTPATIENT)
Dept: GENERAL RADIOLOGY | Facility: CLINIC | Age: 87
End: 2022-02-07
Attending: INTERNAL MEDICINE
Payer: MEDICARE

## 2022-02-07 VITALS
BODY MASS INDEX: 27.48 KG/M2 | TEMPERATURE: 98.1 F | RESPIRATION RATE: 16 BRPM | WEIGHT: 140 LBS | HEIGHT: 60 IN | SYSTOLIC BLOOD PRESSURE: 134 MMHG | OXYGEN SATURATION: 97 % | DIASTOLIC BLOOD PRESSURE: 70 MMHG | HEART RATE: 96 BPM

## 2022-02-07 DIAGNOSIS — E87.1 HYPONATREMIA: ICD-10-CM

## 2022-02-07 DIAGNOSIS — J18.9 PNEUMONIA OF BOTH LUNGS DUE TO INFECTIOUS ORGANISM, UNSPECIFIED PART OF LUNG: ICD-10-CM

## 2022-02-07 DIAGNOSIS — D72.829 LEUKOCYTOSIS, UNSPECIFIED TYPE: ICD-10-CM

## 2022-02-07 DIAGNOSIS — I48.0 PAROXYSMAL ATRIAL FIBRILLATION (H): ICD-10-CM

## 2022-02-07 PROBLEM — R09.02 HYPOXIA: Status: RESOLVED | Noted: 2022-01-08 | Resolved: 2022-02-07

## 2022-02-07 LAB
ANION GAP SERPL CALCULATED.3IONS-SCNC: 3 MMOL/L (ref 3–14)
BUN SERPL-MCNC: 13 MG/DL (ref 7–30)
CALCIUM SERPL-MCNC: 8.8 MG/DL (ref 8.5–10.1)
CHLORIDE BLD-SCNC: 103 MMOL/L (ref 94–109)
CO2 SERPL-SCNC: 28 MMOL/L (ref 20–32)
CREAT SERPL-MCNC: 0.73 MG/DL (ref 0.52–1.04)
GFR SERPL CREATININE-BSD FRML MDRD: 81 ML/MIN/1.73M2
GLUCOSE BLD-MCNC: 102 MG/DL (ref 70–99)
POTASSIUM BLD-SCNC: 3.8 MMOL/L (ref 3.4–5.3)
SODIUM SERPL-SCNC: 134 MMOL/L (ref 133–144)
WBC # BLD AUTO: 10.3 10E3/UL (ref 4–11)

## 2022-02-07 PROCEDURE — 85048 AUTOMATED LEUKOCYTE COUNT: CPT | Performed by: INTERNAL MEDICINE

## 2022-02-07 PROCEDURE — 80048 BASIC METABOLIC PNL TOTAL CA: CPT | Performed by: INTERNAL MEDICINE

## 2022-02-07 PROCEDURE — 99214 OFFICE O/P EST MOD 30 MIN: CPT | Performed by: INTERNAL MEDICINE

## 2022-02-07 PROCEDURE — 36415 COLL VENOUS BLD VENIPUNCTURE: CPT | Performed by: INTERNAL MEDICINE

## 2022-02-07 PROCEDURE — 71046 X-RAY EXAM CHEST 2 VIEWS: CPT | Performed by: RADIOLOGY

## 2022-02-07 ASSESSMENT — ENCOUNTER SYMPTOMS
WEAKNESS: 0
SHORTNESS OF BREATH: 0
DYSURIA: 0
SORE THROAT: 0
ARTHRALGIAS: 0
NAUSEA: 0
PALPITATIONS: 0
BREAST MASS: 0
NERVOUS/ANXIOUS: 0
JOINT SWELLING: 0
HEADACHES: 0
FREQUENCY: 1
FEVER: 0
HEARTBURN: 0
COUGH: 0
ABDOMINAL PAIN: 0
CHILLS: 0
PARESTHESIAS: 1
EYE PAIN: 0
CONSTIPATION: 0
HEMATURIA: 0
DIZZINESS: 0
DIARRHEA: 0
MYALGIAS: 0
HEMATOCHEZIA: 0

## 2022-02-07 ASSESSMENT — MIFFLIN-ST. JEOR: SCORE: 1006.54

## 2022-02-07 ASSESSMENT — ACTIVITIES OF DAILY LIVING (ADL): CURRENT_FUNCTION: NO ASSISTANCE NEEDED

## 2022-02-07 NOTE — PROGRESS NOTES
ASSESSMENT:   1. Pneumonia of both lungs due to infectious organism, unspecified part of lung  Lung exam fairly clear.  Chest x-ray obtained today which shows near resolution of the previous infiltrates by my exam.  Formal read by radiology pending.  Will continue deep breathing exercises.  No indication for further antibiotics  - XR Chest 2 Views; Future    2. Hyponatremia  Likely secondary to previous illness.  Eating well.  Needs lab recheck  - Basic metabolic panel; Future  - Basic metabolic panel    3. Leukocytosis, unspecified type  Likely secondary to previous pneumonia.  Lab recheck.  Currently symptomatic  - WBC count; Future  - WBC count    4. Paroxysmal atrial fibrillation (H)  Like related to stress pneumonia infection.  Did not have any palpitation symptoms in the 30 days after discharge.  Formal event monitor reading pending.  No indication for anticoagulation at this time.  Patient has palpitation symptoms in the future, she will inform physician      PLAN:  Labs as ordered  Chest x-ray  Continue current medications  Inform physician if you have future heart palpitations symptoms.  Will await formal reading of event monitor study and send you results      (Chart documentation was completed, in part, with [employer] voice-recognition software. Even though reviewed, some grammatical, spelling, and word errors may remain.)    Julio Cesar Carrero MD  Internal Medicine Department  [facility] is a 86 year old who presents for the following health issues     HPI       Hospital Follow-up Visit:    Hospital/Nursing Home/IP Rehab Facility: [facility]  Date of Admission: 01/08/2022  Date of Discharge: 01/10/2022  Reason(s) for Admission: Hypoxia, Pneumonia of both lower lobes due to infectious organism      Was your hospitalization related to COVID-19? No   Problems taking medications regularly:  None  Medication changes since discharge:  None  Problems adhering to non-medication therapy:  None    Summary of hospitalization:  [facility] discharge summary reviewed  Diagnostic Tests/Treatments reviewed.  Follow up needed:  CXR and labs  Other Healthcare Providers Involved in Patient s Care:         None  Update since discharge: improved.       Post Discharge Medication Reconciliation: discharge medications reconciled and changed, per note/orders.  Plan of care communicated with patient               Discharge summary reviewed. Part of the summary as below:    Discharge Summary  Hospitalist     Date of Admission:  1/8/2022  Date of Discharge:  1/10/2022  Discharging Provider: Juan J Fletcher MD  Date of Service (when I saw the patient): 01/10/22        Discharge Diagnoses     #Acute hypoxemic respiratory failure secondary to community-acquired pneumonia versus aspiration pneumonia  #Atrial fibrillation with RVR- now in normal sinus rhythm  #Hyponatremia-improved           Hospital Course     Abirl Aguilar is a 86 year old female with a PMH significant for HLD, COPD and OA, who presented to the Emergency Room with complaints of increasing shortness of breath.     #Acute hypoxemic respiratory failure secondary to CAP vs. Aspiration pna: Ms. Aguilar describes several days of body aches and nausea with nonbloody emesis.  She is also had a cough.  No chest pain.  No wheezing.  He has used her albuterol inhaler without much improvement.  ER work-up notable for patient being afebrile but hypoxemic initially requiring 4 L oxygen via nasal cannula.  She was initially in A. fib with RVR and started on diltiazem but converted to sinus rhythm thereafter.  Labs notable for leukocytosis to 15 with Covid-19 test negative.  CT shows bibasilar opacities.  -Patient was treated with IV Levaquin.  She was able to be weaned off oxygen on 1/9.  She felt symptomatically better and voiced readiness for discharge home.  She will complete a 7-day course of  Levaquin on discharge.  Return instructions provided.     #Positive blood culture, staph epi: 1/2 of the patient's blood cultures came back positive for gram-positive cocci in clusters, subsequently came back positive for staph epidermis.  Likely contaminant.       #Afib w RVR, now in sinus rhythm: No known history of atrial fibrillation.  Asymptomatic.  Converted to sinus rhythm in the ER.    TTE without major abnormality.  Her EF was noted to be 60-65%.  No regional wall motion abnormalities.  She was prescribed an event monitor on discharge.  Holding off on anticoagulation given this is likely driven by her underlying infection.  She should follow-up with her PCP.     #Hyponatremia: Mild. Likely driven by n/v.  Received NS overnight. Stop for now. Follow up BMP.      Juan J Fletcher MD      Most recent lab results reviewed with pt.       Noted to have A. fib in the emergency room only.  Not through the rest of her hospital stay.  Patient just recently turned and a 30-day event monitor.  Did not have any symptoms of palpitations while wearing it.  Formal report still pending in chart.  Rare nonproductive cough now.  Breathing significantly better.  Denies fevers or chills.  Denies chest pain or abdominal pain.  Not limited by activity currently.  Still using Advair twice daily.  Has completed course of Levaquin.  Normal appetite now       Additional ROS:   Constitutional, HEENT, Cardiovascular, Pulmonary, GI and , Neuro, MSK and Psych review of systems/symptoms are otherwise negative or unchanged from previous, except as noted above.      OBJECTIVE:  /70   Pulse 96   Temp 98.1  F (36.7  C) (Temporal)   Resp 16   Ht 1.524 m (5')   Wt 63.5 kg (140 lb)   SpO2 97%   BMI 27.34 kg/m     Estimated body mass index is 27.34 kg/m  as calculated from the following:    Height as of this encounter: 1.524 m (5').    Weight as of this encounter: 63.5 kg (140 lb).     HENT: ear canals and TM's normal and nose and mouth  without ulcers or lesions   Neck: no adenopathy. Thyroid normal to palpation. No bruits  Pulm: Lungs clear to auscultation   CV: Regular rates and rhythm  GI: Soft, nontender, Normal active bowel sounds, No hepatosplenomegaly or masses palpable  Ext: Peripheral pulses intact. No edema.

## 2022-02-08 NOTE — PATIENT INSTRUCTIONS
Labs as ordered  Chest x-ray  Continue current medications  Inform physician if you have future heart palpitations symptoms.  Will await formal reading of event monitor study and send you results

## 2022-02-11 ENCOUNTER — TELEPHONE (OUTPATIENT)
Dept: INTERNAL MEDICINE | Facility: CLINIC | Age: 87
End: 2022-02-11
Payer: COMMERCIAL

## 2022-02-11 NOTE — TELEPHONE ENCOUNTER
Heart monitor study ordered by other physician routed into my inbox and reviewed.  No evidence of recurrent atrial fibrillation.  Called patient and updated her regarding benign results.  No new recommendations.  We will continue management as per recent clinic visit

## 2022-06-22 ENCOUNTER — TRANSFERRED RECORDS (OUTPATIENT)
Dept: INTERNAL MEDICINE | Facility: CLINIC | Age: 87
End: 2022-06-22

## 2022-07-13 ENCOUNTER — TRANSFERRED RECORDS (OUTPATIENT)
Dept: HEALTH INFORMATION MANAGEMENT | Facility: CLINIC | Age: 87
End: 2022-07-13

## 2022-09-23 NOTE — PROGRESS NOTES
[facility]    Hospitalist Progress Note      Assessment & Plan   Abril Aguilar is a 86 year old female with a PMH significant for HLD, COPD and OA, who presented to the Emergency Room with complaints of increasing shortness of breath.    #Acute hypoxemic respiratory failure secondary to CAP vs. Aspiration pna: Ms. Aguilar describes several days of body aches and nausea with nonbloody emesis.  She is also had a cough.  No chest pain.  No wheezing.  He has used her albuterol inhaler without much improvement.  ER work-up notable for patient being afebrile but hypoxemic initially requiring 4 L oxygen via nasal cannula.  She was initially in A. fib with RVR and started on diltiazem but converted to sinus rhythm thereafter.  Labs notable for leukocytosis to 15 with Covid-19 test negative.  CT shows bibasilar opacities.  -We will continue Levaquin.  -Lower suspicion for COPD exacerbation is no wheezing on exam.  Continue home inhalers.  -Wean oxygen as able.  She feels improved today.  She is on 2 L currently.  -Stop IVF. Pt able to take in PO.     Addendum: Pt blood cultures came back positive for gram + cocci in clusters. Will give dose of vanco. Follow speciation. May be contaminant.      #Afib w RVR, now in sinus rhythm: No known history of atrial fibrillation.  Asymptomatic.  Converted to sinus rhythm in the ER.  Obtaining TTE.  Event monitor on discharge.  Holding off on anticoagulation given this is likely driven by her underlying infection.    #Hyponatremia: Mild. Likely driven by n/v.  Received NS overnight. Stop for now. Follow up BMP.     DVT Prophylaxis: Enoxaparin (Lovenox) SQ  Code Status: Full Code. Discussed with patient on 1/9. She wants full resuscitation but would not want prolonged intubation.   Dispo: Wean off O2. Possibly tomorrow.     Juan J Fletcher MD  Text Page    Interval History   Assumed care.  Patient feels better today.  Denies any chest pain.  Breathing a bit easier.   Still with some cough.  Has an appetite and is hungry today.  No nausea vomiting.  No abdominal pain.  No diarrhea.    -Data reviewed today: I reviewed all new labs and imaging results over the last 24 hours.     Physical Exam   Temp: 97.1  F (36.2  C) Temp src: Oral BP: (!) 144/72 Pulse: 88   Resp: 20 SpO2: 94 % O2 Device: Nasal cannula Oxygen Delivery: 2 LPM  There were no vitals filed for this visit.  Vital Signs with Ranges  Temp:  [97.1  F (36.2  C)] 97.1  F (36.2  C)  Pulse:  [] 88  Resp:  [15-29] 20  BP: ()/(59-85) 144/72  SpO2:  [92 %-100 %] 94 %  I/O last 3 completed shifts:  In: 30.93 [I.V.:30.93]  Out: 550 [Urine:550]    Constitutional: Nontoxic, NAD  HEENT: Normocephalic. MMM, No elevation of JVD noted.   Respiratory: Nl WOB, Clear bilaterally, Some rhonchi in bases.   Cardiovascular: Regular, no murmur  GI: BS+, NT, ND  Skin/Integumen: WWP, no rash. No edema  Neuro: CNII-XII intact. Moves all extremities. No tremor. A&Ox3.    Medications       enoxaparin ANTICOAGULANT  40 mg Subcutaneous Q24H     fluticasone-salmeterol  2 puff Inhalation BID     levofloxacin  750 mg Oral Daily     sodium chloride (PF)  3 mL Intracatheter Q8H     sodium chloride (PF)  3 mL Intracatheter Q8H       Data   Recent Labs   Lab 22  1142 22  1043   WBC 13.9* 15.1*   HGB 12.5 13.1   MCV 91 92    198   * 126*   POTASSIUM 3.5 3.5   CHLORIDE 99 93*   CO2 24 22   BUN 13 13   CR 0.61 0.69   ANIONGAP 8 11   ELSIE 9.0 9.0   * 104*       Recent Results (from the past 24 hour(s))   Echocardiogram Complete   Result Value    LVEF  60-65%    Narrative    [id number]  [id number]  [id number]  [id number]^LC^NAHOMY^[facility]  Echocardiography Laboratory  [address] [place] [address]     Name: HECTOR MIN  MRN: [id number]  : 1935  Study Date: 2022 08:21 AM  Age: 86 yrs  Gender: Female  Patient Location: [facility]  Reason For Study: Syncope  Ordering  Physician: NAHOMY PLATT  Referring Physician: Julio Cesar Carrero  Performed By: Yvonne Zheng     BSA: 1.6 m2  Height: 60 in  Weight: 145 lb  HR: 79  BP: 112/68 mmHg  ______________________________________________________________________________  Procedure  Complete Portable Echo Adult.  ______________________________________________________________________________  Interpretation Summary     1. Normal left ventricular size and systolic function. LVEF 60-65%.  2. No regional wall motion abnormalities.  3. Normal right ventricular size and systolic function.  4. No significant valve disease.     There is no comparison study available.  ______________________________________________________________________________  Left Ventricle  The left ventricle is normal in size. There is normal left ventricular wall  thickness. Left ventricular systolic function is normal. The visual ejection  fraction is 60-65%. Grade I or early diastolic dysfunction. No regional wall  motion abnormalities noted.     Right Ventricle  The right ventricle is grossly normal size. The right ventricle is not well  visualized. The right ventricular systolic function is normal.     Atria  Normal left atrial size. Right atrium not well visualized. There is no color  Doppler evidence of an atrial shunt.     Mitral Valve  The mitral valve leaflets appear normal. There is no evidence of stenosis,  fluttering, or prolapse. There is mild mitral annular calcification. There is  trace mitral regurgitation.     Tricuspid Valve  Normal tricuspid valve. There is trace tricuspid regurgitation.     Aortic Valve  The aortic valve is trileaflet with aortic valve sclerosis. No aortic  regurgitation is present. No aortic stenosis is present.     Pulmonic Valve  The pulmonic valve is not well visualized. This degree of valvular  regurgitation is within normal limits.     Vessels  The aortic root is normal size. Normal size ascending aorta. The inferior  vena  cava is normal.     Pericardium  There is no pericardial effusion.     Rhythm  Sinus rhythm was noted.  ______________________________________________________________________________  MMode/2D Measurements & Calculations  IVSd: 1.1 cm     LVIDd: 4.0 cm  LVIDs: 2.4 cm  LVPWd: 0.80 cm  FS: 39.7 %  LV mass(C)d: 118.5 grams  LV mass(C)dI: 72.8 grams/m2  Ao root diam: 2.9 cm  LA dimension: 3.3 cm  asc Aorta Diam: 2.7 cm  LA/Ao: 1.1  LVOT diam: 1.8 cm  LVOT area: 2.5 cm2  LA Volume (BP): 47.7 ml  LA Volume Index (BP): 29.3 ml/m2  RWT: 0.40     Doppler Measurements & Calculations  MV E max ruthy: 65.5 cm/sec  MV A max ruthy: 77.1 cm/sec  MV E/A: 0.85  MV dec time: 0.23 sec  LV V1 max P.3 mmHg  LV V1 max: 104.0 cm/sec  LV V1 VTI: 17.7 cm  SV(LVOT): 45.0 ml  SI(LVOT): 27.7 ml/m2  PA acc time: 0.13 sec  E/E' av.3  Lateral E/e': 9.1  Medial E/e': 9.6     ______________________________________________________________________________  Report approved by: Dr Vito Bourne 2022 11:22 AM              patient reports falling off a skateboard x 1 week ago, c/o L leg pain-- presents ambulatory, +full weight bearing

## 2022-10-15 ENCOUNTER — HEALTH MAINTENANCE LETTER (OUTPATIENT)
Age: 87
End: 2022-10-15

## 2023-02-16 ENCOUNTER — OFFICE VISIT (OUTPATIENT)
Dept: INTERNAL MEDICINE | Facility: CLINIC | Age: 88
End: 2023-02-16
Payer: MEDICARE

## 2023-02-16 VITALS
BODY MASS INDEX: 27.79 KG/M2 | OXYGEN SATURATION: 97 % | SYSTOLIC BLOOD PRESSURE: 154 MMHG | HEART RATE: 100 BPM | WEIGHT: 142.3 LBS | TEMPERATURE: 98.6 F | DIASTOLIC BLOOD PRESSURE: 98 MMHG

## 2023-02-16 DIAGNOSIS — Z09 ENCOUNTER FOR EXAMINATION FOLLOWING TREATMENT AT HOSPITAL: Primary | ICD-10-CM

## 2023-02-16 DIAGNOSIS — E87.1 HYPONATREMIA: ICD-10-CM

## 2023-02-16 LAB
ANION GAP SERPL CALCULATED.3IONS-SCNC: 13 MMOL/L (ref 7–15)
BASOPHILS # BLD AUTO: 0 10E3/UL (ref 0–0.2)
BASOPHILS NFR BLD AUTO: 0 %
BUN SERPL-MCNC: 17.6 MG/DL (ref 8–23)
CALCIUM SERPL-MCNC: 9.9 MG/DL (ref 8.8–10.2)
CHLORIDE SERPL-SCNC: 99 MMOL/L (ref 98–107)
CREAT SERPL-MCNC: 0.81 MG/DL (ref 0.51–0.95)
DEPRECATED HCO3 PLAS-SCNC: 24 MMOL/L (ref 22–29)
EOSINOPHIL # BLD AUTO: 0.1 10E3/UL (ref 0–0.7)
EOSINOPHIL NFR BLD AUTO: 1 %
ERYTHROCYTE [DISTWIDTH] IN BLOOD BY AUTOMATED COUNT: 13.7 % (ref 10–15)
GFR SERPL CREATININE-BSD FRML MDRD: 71 ML/MIN/1.73M2
GLUCOSE SERPL-MCNC: 102 MG/DL (ref 70–99)
HCT VFR BLD AUTO: 41.9 % (ref 35–47)
HGB BLD-MCNC: 13.3 G/DL (ref 11.7–15.7)
LYMPHOCYTES # BLD AUTO: 2.1 10E3/UL (ref 0.8–5.3)
LYMPHOCYTES NFR BLD AUTO: 18 %
MCH RBC QN AUTO: 29.6 PG (ref 26.5–33)
MCHC RBC AUTO-ENTMCNC: 31.7 G/DL (ref 31.5–36.5)
MCV RBC AUTO: 93 FL (ref 78–100)
MONOCYTES # BLD AUTO: 1.5 10E3/UL (ref 0–1.3)
MONOCYTES NFR BLD AUTO: 12 %
NEUTROPHILS # BLD AUTO: 8.4 10E3/UL (ref 1.6–8.3)
NEUTROPHILS NFR BLD AUTO: 69 %
PLATELET # BLD AUTO: 318 10E3/UL (ref 150–450)
POTASSIUM SERPL-SCNC: 4.2 MMOL/L (ref 3.4–5.3)
RBC # BLD AUTO: 4.49 10E6/UL (ref 3.8–5.2)
SODIUM SERPL-SCNC: 136 MMOL/L (ref 136–145)
WBC # BLD AUTO: 12.1 10E3/UL (ref 4–11)

## 2023-02-16 PROCEDURE — 36415 COLL VENOUS BLD VENIPUNCTURE: CPT | Performed by: INTERNAL MEDICINE

## 2023-02-16 PROCEDURE — 99213 OFFICE O/P EST LOW 20 MIN: CPT | Performed by: INTERNAL MEDICINE

## 2023-02-16 PROCEDURE — 80048 BASIC METABOLIC PNL TOTAL CA: CPT | Performed by: INTERNAL MEDICINE

## 2023-02-16 PROCEDURE — 85025 COMPLETE CBC W/AUTO DIFF WBC: CPT | Performed by: INTERNAL MEDICINE

## 2023-02-16 RX ORDER — SULFAMETHOXAZOLE/TRIMETHOPRIM 800-160 MG
1 TABLET ORAL
COMMUNITY
Start: 2023-02-03 | End: 2023-02-16

## 2023-02-16 NOTE — PROGRESS NOTES
Assessment & Plan   Encounter for examination following treatment at hospital  Hyponatremia  Abril was admitted for what sounds to be hypovolemia hyponatremia secondary to GI upset from Bactrim (which she was prescribed for UTI). Sodium had risen to 134 by time of discharge. Will repeat labs today. Discussed no role of repeat U/A given her urinary symptoms are gone and it sounds like she was appropriately treated with what I presume was ceftriaxone inpatient. Discussed electrolyte-rich fluids. She reports she has her normal appetite.  - Basic metabolic panel; Future  - CBC with Platelets & Differential; Future    MED REC REQUIRED  Post Medication Reconciliation Status:  Discharge medications reconciled, continue medications without change    F/u with regular PCP at regular interval or sooner PRN    Javier Lancaster MD  [facility]   Abril is a 87 year old presenting for the following health issues:  Hospital F/U  F/u with regular PCP at regular interval or sooner PRN    hospitals   Hospital Follow-up Visit:  Hospital/Nursing Home/ Rehab Facility: [facility]  Date of Admission: 2/7/2023  Date of Discharge: 2/9/2023  Reason(s) for Admission: Hyponatermia     No documentation available for review other than one line on patient's discharge instructions that state she was admitted with sodium of 119 which had increased to 134 at time of discharge. It sounds like her UTI was treated with IV antibiotics. No recurrence of urinary symptoms. She still feels a bit run down but thinks she's slowly improving.    Review of Systems   Constitutional, gu systems are negative, except as otherwise noted.      Objective    BP (!) 154/98   Pulse 100   Temp 98.6  F (37  C) (Temporal)   Wt 64.5 kg (142 lb 4.8 oz)   SpO2 97%   BMI 27.79 kg/m    Body mass index is 27.79 kg/m .     Physical Exam   GENERAL: alert and in no distress.  EYES: conjunctivae/corneas clear. EOMs grossly  intact  HENT: Facies symmetric.  RESP: No iWOB.  MSK: Moves all four extremities freely  SKIN: No significant ulcers, lesions, or rashes on the visualized portions of the skin  NEURO: CN II-XII grossly intact.

## 2023-02-16 NOTE — PATIENT INSTRUCTIONS
- Peyton, soup broth, Gatorade/Powerade  - I will send you a message on [employer] [facility] when I am able to look at the results of your tests from today

## 2023-02-21 ENCOUNTER — OFFICE VISIT (OUTPATIENT)
Dept: INTERNAL MEDICINE | Facility: CLINIC | Age: 88
End: 2023-02-21
Payer: MEDICARE

## 2023-02-21 VITALS
OXYGEN SATURATION: 98 % | DIASTOLIC BLOOD PRESSURE: 90 MMHG | BODY MASS INDEX: 28.13 KG/M2 | WEIGHT: 143.3 LBS | HEIGHT: 60 IN | HEART RATE: 98 BPM | SYSTOLIC BLOOD PRESSURE: 150 MMHG | RESPIRATION RATE: 16 BRPM

## 2023-02-21 DIAGNOSIS — R30.0 DYSURIA: ICD-10-CM

## 2023-02-21 DIAGNOSIS — N30.01 ACUTE CYSTITIS WITH HEMATURIA: Primary | ICD-10-CM

## 2023-02-21 LAB
ALBUMIN UR-MCNC: NEGATIVE MG/DL
APPEARANCE UR: CLEAR
BACTERIA #/AREA URNS HPF: ABNORMAL /HPF
BILIRUB UR QL STRIP: NEGATIVE
COLOR UR AUTO: YELLOW
GLUCOSE UR STRIP-MCNC: NEGATIVE MG/DL
HGB UR QL STRIP: ABNORMAL
KETONES UR STRIP-MCNC: NEGATIVE MG/DL
LEUKOCYTE ESTERASE UR QL STRIP: ABNORMAL
NITRATE UR QL: NEGATIVE
PH UR STRIP: 6 [PH] (ref 5–7)
RBC #/AREA URNS AUTO: ABNORMAL /HPF
SP GR UR STRIP: <=1.005 (ref 1–1.03)
SQUAMOUS #/AREA URNS AUTO: ABNORMAL /LPF
UROBILINOGEN UR STRIP-ACNC: 0.2 E.U./DL
WBC #/AREA URNS AUTO: ABNORMAL /HPF

## 2023-02-21 PROCEDURE — 99213 OFFICE O/P EST LOW 20 MIN: CPT | Performed by: PHYSICIAN ASSISTANT

## 2023-02-21 PROCEDURE — 87186 SC STD MICRODIL/AGAR DIL: CPT | Performed by: PHYSICIAN ASSISTANT

## 2023-02-21 PROCEDURE — 87086 URINE CULTURE/COLONY COUNT: CPT | Performed by: PHYSICIAN ASSISTANT

## 2023-02-21 PROCEDURE — 81001 URINALYSIS AUTO W/SCOPE: CPT | Performed by: PHYSICIAN ASSISTANT

## 2023-02-21 RX ORDER — CEPHALEXIN 500 MG/1
500 CAPSULE ORAL 3 TIMES DAILY
Qty: 21 CAPSULE | Refills: 0 | Status: SHIPPED | OUTPATIENT
Start: 2023-02-21 | End: 2023-02-28

## 2023-02-21 NOTE — PROGRESS NOTES
Assessment & Plan     Acute cystitis with hematuria    - cephALEXin (KEFLEX) 500 MG capsule; Take 1 capsule (500 mg) by mouth 3 times daily for 7 days    Dysuria    - UA with Microscopic reflex to Culture - Clinic Collect  - Urine Microscopic  - Urine Culture                 BMI:   Estimated body mass index is 27.99 kg/m  as calculated from the following:    Height as of this encounter: 1.524 m (5').    Weight as of this encounter: 65 kg (143 lb 4.8 oz).       Fluids rest  abx  Culture pending  Will call if need to change abx  Otherwise results on my chart     Return in about 2 weeks (around 3/7/2023) for recheck if not improving, regular primary provider.    GABRIELA Marques [facility] REBECCA Samuels is a 87 year old, presenting for the following health issues:  UTI      HPI     Genitourinary - Female  Onset/Duration: Yesterday  Description:   Painful urination (Dysuria): YES           Frequency: YES  Blood in urine (Hematuria): No  Delay in urine (Hesitency): No  Intensity: mild, moderate  Progression of Symptoms:  worsening  Accompanying Signs & Symptoms:  Fever/chills: No  Flank pain: No  Nausea and vomiting: No  Vaginal symptoms: Irritation  Abdominal/Pelvic Pain: No  History:   History of frequent UTI s: YES  History of kidney stones: No  Sexually Active: No  Possibility of pregnancy: No  Precipitating or alleviating factors: None  Therapies tried and outcome:   Seen at  in [place] in 2/3-   Treatment with septra DS for UTI-   Then had hyponatremia and was in the hospital for a few days - see hospital follow up notes from 2/16 with Dr Womack          Review of Systems   Constitutional, HEENT, cardiovascular, pulmonary, gi and gu systems are negative, except as otherwise noted.      Objective    BP (!) 164/92   Pulse 98   Resp 16   Ht 1.524 m (5')   Wt 65 kg (143 lb 4.8 oz)   SpO2 98%   BMI 27.99 kg/m    Body mass index is 27.99 kg/m .  Physical Exam    GENERAL: healthy, alert and no distress  RESP: lungs clear to auscultation - no rales, rhonchi or wheezes  CV: regular rates and rhythm  ABDOMEN: soft, nontender, no hepatosplenomegaly, no masses and bowel sounds normal  MS: no gross musculoskeletal defects noted, no edema    Results for orders placed or performed in visit on 02/21/23 (from the past 24 hour(s))   UA with Microscopic reflex to Culture - Clinic Collect    Specimen: Urine, Clean Catch   Result Value Ref Range    Color Urine Yellow Colorless, Straw, Light Yellow, Yellow    Appearance Urine Clear Clear    Glucose Urine Negative Negative mg/dL    Bilirubin Urine Negative Negative    Ketones Urine Negative Negative mg/dL    Specific Gravity Urine <=1.005 1.003 - 1.035    Blood Urine Trace (A) Negative    pH Urine 6.0 5.0 - 7.0    Protein Albumin Urine Negative Negative mg/dL    Urobilinogen Urine 0.2 0.2, 1.0 E.U./dL    Nitrite Urine Negative Negative    Leukocyte Esterase Urine Small (A) Negative   Urine Microscopic   Result Value Ref Range    Bacteria Urine Few (A) None Seen /HPF    RBC Urine 0-2 0-2 /HPF /HPF    WBC Urine 10-25 (A) 0-5 /HPF /HPF    Squamous Epithelials Urine Few (A) None Seen /LPF

## 2023-02-24 LAB — BACTERIA UR CULT: ABNORMAL

## 2023-07-17 ENCOUNTER — OFFICE VISIT (OUTPATIENT)
Dept: INTERNAL MEDICINE | Facility: CLINIC | Age: 88
End: 2023-07-17
Payer: MEDICARE

## 2023-07-17 VITALS
SYSTOLIC BLOOD PRESSURE: 196 MMHG | WEIGHT: 141.5 LBS | TEMPERATURE: 97.5 F | BODY MASS INDEX: 27.78 KG/M2 | DIASTOLIC BLOOD PRESSURE: 79 MMHG | OXYGEN SATURATION: 98 % | HEIGHT: 60 IN | HEART RATE: 63 BPM

## 2023-07-17 DIAGNOSIS — I10 HYPERTENSION, UNSPECIFIED TYPE: ICD-10-CM

## 2023-07-17 DIAGNOSIS — Z00.00 MEDICARE ANNUAL WELLNESS VISIT, SUBSEQUENT: Primary | ICD-10-CM

## 2023-07-17 DIAGNOSIS — J44.9 CHRONIC OBSTRUCTIVE PULMONARY DISEASE, UNSPECIFIED COPD TYPE (H): ICD-10-CM

## 2023-07-17 DIAGNOSIS — N39.41 URGE INCONTINENCE OF URINE: ICD-10-CM

## 2023-07-17 PROCEDURE — 69210 REMOVE IMPACTED EAR WAX UNI: CPT | Mod: LT | Performed by: INTERNAL MEDICINE

## 2023-07-17 PROCEDURE — 99214 OFFICE O/P EST MOD 30 MIN: CPT | Mod: 25 | Performed by: INTERNAL MEDICINE

## 2023-07-17 PROCEDURE — G0439 PPPS, SUBSEQ VISIT: HCPCS | Performed by: INTERNAL MEDICINE

## 2023-07-17 RX ORDER — LOSARTAN POTASSIUM 25 MG/1
25 TABLET ORAL DAILY
Qty: 30 TABLET | Refills: 11 | Status: SHIPPED | OUTPATIENT
Start: 2023-07-17 | End: 2023-07-27 | Stop reason: DRUGHIGH

## 2023-07-17 ASSESSMENT — ENCOUNTER SYMPTOMS
SORE THROAT: 0
COUGH: 0
HEADACHES: 0
FREQUENCY: 1
MYALGIAS: 0
NERVOUS/ANXIOUS: 0
HEMATURIA: 0
PALPITATIONS: 0
NAUSEA: 0
SHORTNESS OF BREATH: 0
ARTHRALGIAS: 0
HEMATOCHEZIA: 0
JOINT SWELLING: 0
ABDOMINAL PAIN: 0
DIZZINESS: 0
PARESTHESIAS: 0
CHILLS: 0
CONSTIPATION: 0
EYE PAIN: 0
FEVER: 0
WEAKNESS: 0
DIARRHEA: 0
DYSURIA: 0
HEARTBURN: 0

## 2023-07-17 ASSESSMENT — ACTIVITIES OF DAILY LIVING (ADL): CURRENT_FUNCTION: NO ASSISTANCE NEEDED

## 2023-07-17 NOTE — PROGRESS NOTES
"SUBJECTIVE:   Abril is a 87 year old who presents for Preventive Visit and follow-up recent elevted BP x2 noted  with clinic visits    Are you in the first 12 months of your Medicare coverage?  No    Healthy Habits:     In general, how would you rate your overall health?  Excellent    Frequency of exercise:  None    Do you usually eat at least 4 servings of fruit and vegetables a day, include whole grains    & fiber and avoid regularly eating high fat or \"junk\" foods?  Yes    Taking medications regularly:  Yes    Medication side effects:  Other    Ability to successfully perform activities of daily living:  No assistance needed    Home Safety:  Lack of grab bars in the bathroom    Hearing Impairment:  Difficulty understanding soft or whispered speech    In the past 6 months, have you been bothered by leaking of urine? Yes    In general, how would you rate your overall mental or emotional health?  Excellent    Additional concerns today:  Yes    Additional concerns: Trouble hearing in left ear, interested in getting it checked for wax. Leaking of urine in the mornings.    Have you ever done Advance Care Planning? (For example, a Health Directive, POLST, or a discussion with a medical provider or your loved ones about your wishes): Yes, advance care planning is on file.       Fall risk  Fallen 2 or more times in the past year?: No  Any fall with injury in the past year?: No    Cognitive Screening   1) Repeat 3 items (Leader, Season, Table)    2) Clock draw: NORMAL  3) 3 item recall: Recalls 3 objects  Results: 3 items recalled: COGNITIVE IMPAIRMENT LESS LIKELY    Mini-CogTM Copyright MICHAEL Mosley. Licensed by the author for use in [facility]; reprinted with permission (nitza@.[facility]). All rights reserved.      Do you have sleep apnea, excessive snoring or daytime drowsiness?: no    Reviewed and updated as needed this visit by clinical staff                  Reviewed and updated as needed this visit by " Provider                 Social History     Tobacco Use     Smoking status: Never     Smokeless tobacco: Never   Substance Use Topics     Alcohol use: No            2/7/2022    10:26 AM   Alcohol Use   Prescreen: >3 drinks/day or >7 drinks/week? No     Do you have a current opioid prescription? No  Do you use any other controlled substances or medications that are not prescribed by a provider? None              Current providers sharing in care for this patient include:   Patient Care Team:  Julio Cesar Carrero MD as PCP - General (Internal Medicine)  Julio Cesar Carrero MD as Assigned PCP    The following health maintenance items are reviewed in Epic and correct as of today:  Health Maintenance   Topic Date Due     ANNUAL REVIEW OF HM ORDERS  Never done     COPD ACTION PLAN  Never done     ZOSTER IMMUNIZATION (2 of 3) 08/24/2009     COVID-19 Vaccine (4 - Pfizer series) 01/07/2022     MEDICARE ANNUAL WELLNESS VISIT  08/27/2022     INFLUENZA VACCINE (1) 09/01/2023     FALL RISK ASSESSMENT  02/16/2024     DTAP/TDAP/TD IMMUNIZATION (2 - Td or Tdap) 05/27/2025     ADVANCE CARE PLANNING  08/28/2026     SPIROMETRY  Completed     PHQ-2 (once per calendar year)  Completed     Pneumococcal Vaccine: 65+ Years  Completed     IPV IMMUNIZATION  Aged Out     MENINGITIS IMMUNIZATION  Aged Out     Labs reviewed in EPIC      Mammogram Screening - Patient over age 75, has elected to discontinue screenings.  Pertinent mammograms are reviewed under the imaging tab.    Review of Systems   Constitutional: Negative for chills and fever.   HENT: Positive for hearing loss. Negative for congestion, ear pain and sore throat.    Eyes: Negative for pain and visual disturbance.   Respiratory: Negative for cough and shortness of breath.    Cardiovascular: Negative for chest pain, palpitations and peripheral edema.   Gastrointestinal: Negative for abdominal pain, constipation, diarrhea, heartburn, hematochezia and nausea.   Genitourinary: Positive for  frequency and urgency. Negative for dysuria, genital sores and hematuria.   Musculoskeletal: Negative for arthralgias, joint swelling and myalgias.   Skin: Negative for rash.   Neurological: Negative for dizziness, weakness, headaches and paresthesias.   Psychiatric/Behavioral: Negative for mood changes. The patient is not nervous/anxious.      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 Has injection appt for eye tomorrow and done every 6 weeks  Has some hearing loss left side worse than right. Wondering if wax  BP elevated today and past 2 visits  Mild urinary leakage. Nocturia x2-3 for years. No straining of urine. Caffeine with 2 cups coffee in AM. Occ ice tea. No dysuria  Had used  a pessary in the past   COPD managed by Dr Roberts    OBJECTIVE:   BP (!) 196/79   Pulse 63   Temp 97.5  F (36.4  C) (Temporal)   Ht 1.524 m (5')   Wt 64.2 kg (141 lb 8 oz)   SpO2 98%   BMI 27.63 kg/m   Estimated body mass index is 27.99 kg/m  as calculated from the following:    Height as of 2/21/23: 1.524 m (5').    Weight as of 2/21/23: 65 kg (143 lb 4.8 oz).  Physical Exam  General appearance -   alert, no distress  Skin - No rashes or lesions.  Head - normocephalic, atraumatic  Eyes - KURT, EOMI, fundi exam with nondilated pupils negative.  Ears - External ears normal. Canal clear right and cerumen left. Following verbal consent, cerumen removed by MD with ring-tip probe without complication. Following that, hearing OK left and TM's normal.  Nose/Sinuses - Nares normal. Septum midline. Mucosa normal. No drainage or sinus tenderness.  Oropharynx - No erythema, no adenopathy, no exudates.  Neck - Supple without adenopathy or thyromegaly. No bruits.  Lungs - Clear to auscultation without wheezes/rhonchi.  Heart - Regular rate and rhythm without murmurs, clicks, or gallops.  Nodes - No supraclavicular, axillary, or inguinal adenopathy palpable.  Breasts - deferred  Abdomen - Abdomen soft,  non-tender. BS normal. No masses or hepatosplenomegaly palpable. No bruits.  Extremities -No cyanosis, clubbing or edema.    Musculoskeletal - Spine ROM normal. Muscular strength intact.  DIP and PIP joints hands with osteoarthritis thickening. No erythema or warmth  Peripheral pulses - radial=4/4, femoral=4/4, posterior tibial=4/4, dorsalis pedis=4/4,  Neuro - Gait stable. Reflexes normal and symmetric. Sensation grossly WNL.  Genital/Rectal - deferred      ASSESSMENT / PLAN:   1. Medicare annual wellness visit, subsequent   See plan discussion below re: HCM. Otherwise UTD for age.  Independent in ADLs. Cerumen removed left without complication    2. Hypertension, unspecified type  Uncontrolled.  Start losartan  - losartan (COZAAR) 25 MG tablet; Take 1 tablet (25 mg) by mouth daily  Dispense: 30 tablet; Refill: 11    3. Chronic obstructive pulmonary disease, unspecified COPD type (H)  Controlled.  Continue inhaler therapy Dr Roberts (Pulmonary)     4. Urge incontinence of urine  Declines anticholinergic medication therapy.  Wishes to see gynecology to discuss possible pessary use vs other      Patient has been advised of split billing requirements and indicates understanding: Yes      COUNSELING:  Reviewed preventive health counseling, as reflected in patient instructions        She reports that she has never smoked. She has never used smokeless tobacco.      Appropriate preventive services were discussed with this patient, including applicable screening as appropriate for cardiovascular disease, diabetes, osteopenia/osteoporosis, and glaucoma.  As appropriate for age/gender, discussed screening for colorectal cancer, prostate cancer, breast cancer, and cervical cancer. Checklist reviewing preventive services available has been given to the patient.    Reviewed patients plan of care and provided an AVS. The Basic Care Plan (routine screening as documented in Health Maintenance) for Abril meets the Care Plan  requirement. This Care Plan has been established and reviewed with the Patient.     PLAN:   Losartan 25mg tab, 1 tab daily in AM for blood pressure  Continue other medications   Schedule an appointment  (Dr Daniel Saavedra) with [facility] re: urinary incontinence and possible pessary use vs other. Call [phone] to schedule the  appointment  Reduce caffeine intake if urination bothersome  In a few days, then irrigate left ear a little more with some shower water  Get a blood pressure recheck   in  3 weeks at nearest [facility] pharmacy. You will need to go to [email or web address] to schedule the blood pressure check appointment.  Consider Shingrix vaccinations at your pharmacy (now Medicare covered). This is a 2 shot series done 2-6 months apart  Covid bivalent booster vaccination in the Fall or earlier if wish to have extra covid risk reduction now, may get through pharmacy  Pt was informed regarding extra E&M billing for management of new or established medical issues not related to today's wellness/screening visit        Julio Cesar Carrero MD  [facility]    Identified Health Risks:    I have reviewed Opioid Use Disorder and Substance Use Disorder risk factors and  NA      She is at risk for lack of exercise and has been provided with information to increase physical activity for the benefit of her well-being.  The patient was provided with written information regarding signs of hearing loss.  Information on urinary incontinence and treatment options given to patient.

## 2023-07-17 NOTE — PATIENT INSTRUCTIONS
Losartan 25mg tab, 1 tab daily in AM for blood pressure  Continue other medications   Schedule an appointment  (Dr Daniel Saavedra) with [facility] re: urinary incontinence and possible pessary use vs other. Call [phone] to schedule the  appointment  Reduce caffeine intake if urination bothersome  In a few days, then irrigate left ear a little more with some shower water  Get a blood pressure recheck   in  3 weeks at nearest [place] pharmacy. You will need to go to [email or web address] to schedule the blood pressure check appointment.  Consider Shingrix vaccinations at your pharmacy (now Medicare covered). This is a 2 shot series done 2-6 months apart  Covid bivalent booster vaccination in the Fall or earlier if wish to have extra covid risk reduction now, may get through pharmacy  Pt was informed regarding extra E&M billing for management of new or established medical issues not related to today's wellness/screening visit      Patient Education   Personalized Prevention Plan  You are due for the preventive services outlined below.  Your care team is available to assist you in scheduling these services.  If you have already completed any of these items, please share that information with your care team to update in your medical record.  Health Maintenance Due   Topic Date Due     ANNUAL REVIEW OF HM ORDERS  Never done     COPD Action Plan  Never done     Zoster (Shingles) Vaccine (2 of 3) 08/24/2009     COVID-19 Vaccine (4 - Pfizer series) 01/07/2022       Exercise for a Healthier Heart  You may wonder how you can improve the health of your heart. If you re thinking about exercise, you re on the right track. You don t need to become an athlete. But you do need a certain amount of brisk exercise to help strengthen your heart. If you have been diagnosed with a heart condition, your healthcare provider may advise exercise to help your condition. To help make exercise a habit, choose safe, fun  activities.      Exercise with a friend. When activity is fun, you're more likely to stick with it.     Before you start  Check with your healthcare provider before starting an exercise program. This is especially important if you haven't been active for a while. It's also important if you have a long-term (chronic) health problem such as heart disease, diabetes, or obesity. Also check with your provider if you're at high risk for having these problems.   Why exercise?  Exercising regularly offers many healthy rewards. It can help you do all of these:     Improve your blood cholesterol level to help prevent further heart trouble.    Lower your blood pressure to help prevent a stroke or heart attack.    Control diabetes or reduce your risk of getting this disease.    Improve your heart and lung function.    Reach and stay at a healthy weight.    Make your muscles stronger so you can stay active.    Prevent falls and fractures by slowing the loss of bone mass (osteoporosis).    Manage stress better.    Improve your sense of self and your body image.  Exercise tips      Ease into your routine. Set small goals. Then build on them. Talk with your healthcare provider first before starting an exercise routine if you're not sure what your activity level should be.    Exercise on most days. Aim for a total of at least 150 minutes (2 hours and 30 minutes) or more of moderate-intensity aerobic activity each week. You could also do 75 minutes (1 hour and 15 minutes) or more of vigorous-intensity aerobic activity each week. Or try for a combination of both. Moderate activity means that you breathe heavier and your heart rate increases, but you can still talk. Think about doing at least 30 minutes of moderate exercise, 5 times a week. It's OK to work up to the 30-minute period over time. Examples of moderate-intensity activity are brisk walking, gardening, and water aerobics.    Step up your daily activity level.  Along with your  exercise program, try being more active the whole day. Walk instead of drive. Or park further away so that you take more steps each day. Do more household tasks or yard work. You may not be able to meet the advised amount of physical activity. But doing some moderate- or vigorous-intensity aerobic activity can help reduce your risk for heart disease. Your healthcare provider can help you figure out what is best for you.    Choose 1 or more activities you enjoy.  Walking is one of the easiest things you can do. You can also try swimming, riding a bike, dancing, or taking an exercise class.    Call 911  Call 911 right away if any of these occur:     Chest pain that doesn't go away quickly with rest    New burning, tightness, pressure, or heaviness in your chest, neck, shoulders, back, or arms    Abnormal or severe shortness of breath    A very fast or irregular heartbeat (palpitations)    Fainting  When to call your healthcare provider  Call your healthcare provider if you have any of these:     Dizziness or lightheadedness    Mild shortness of breath or chest pain    Increased or new joint or muscle pain    Let[employer] last reviewed this educational content on 7/1/2022 2000-2023 [employer]. All rights reserved. This information is not intended as a substitute for professional medical care. Always follow your healthcare professional's instructions.          Signs of Hearing Loss  Hearing loss is a problem shared by many people. In fact, it's one of the most common health problems, particularly as people age. Most people aged 65 and older have some hearing loss. By age 80, almost everyone does. Hearing loss often occurs slowly over the years. So, you may not realize your hearing has gotten worse.   When sudden hearing loss occurs, it's important to contact your healthcare provider right away. Your provider will do a medical exam and a hearing exam as soon as possible. This is to help find the cause and  type of your sudden hearing loss. Based on your diagnosis, your healthcare provider will discuss possible treatments.      Hearing much better with one ear can be a sign of hearing loss.     Have your hearing checked  Call your healthcare provider if you:     Have to strain to hear normal conversation    Have to watch other people s faces very carefully to follow what they re saying    Need to ask people to repeat what they ve said    Often misunderstand what people are saying    Turn the volume of the television or radio up so high that others complain    Feel that people are mumbling when they re talking to you    Find that the effort to hear leaves you feeling tired and irritated    Notice, when using the phone, that you hear better with one ear than the other  [employer] last reviewed this educational content on 6/1/2022 2000-2023 [employer]. All rights reserved. This information is not intended as a substitute for professional medical care. Always follow your healthcare professional's instructions.          Urinary Incontinence, Female (Adult)   Urinary incontinence means loss of bladder control. This problem affects many women, especially as they get older. If you have incontinence, you may be embarrassed to ask for help. But know that this problem can be treated.   Types of Incontinence  There are different types of incontinence. Two of the main types are described here. You can have more than one type.     Stress incontinence. With this type, urine leaks when pressure (stress) is put on the bladder. This may happen when you cough, sneeze, or laugh. Stress incontinence most often occurs because the pelvic floor muscles that support the bladder and urethra are weak. This can happen after pregnancy and vaginal childbirth or a hysterectomy. It can also be due to excess body weight or hormone changes.    Urge incontinence (also called overactive bladder). With this type, a sudden urge to urinate  is felt often. This may happen even though there may not be much urine in the bladder. The need to urinate often during the night is common. Urge incontinence most often occurs because of bladder spasms. This may be due to bladder irritation or infection. Damage to bladder nerves or pelvic muscles, constipation, and certain medicines can also lead to urge incontinence.  Treatment depends on the cause. Further evaluation is needed to find the type you have. This will likely include an exam and certain tests. Based on the results, you and your healthcare provider can then plan treatment. Until a diagnosis is made, the home care tips below can help ease symptoms.   Home care    Do pelvic floor muscle exercises, if they are prescribed. The pelvic floor muscles help support the bladder and urethra. Many women find that their symptoms improve when doing special exercises that strengthen these muscles. To do the exercises, contract the muscles you would use to stop your stream of urine. But do this when you re not urinating. Hold for 10 seconds, then relax. Repeat 10 to 20 times in a row, at least 3 times a day. Your healthcare provider may give you other instructions for how to do the exercises and how often.    Keep a bladder diary. This helps track how often and how much you urinate over a set period of time. Bring this diary with you to your next visit with the provider. The information can help your provider learn more about your bladder problem.    Lose weight, if advised to by your provider. Extra weight puts pressure on the bladder. Your provider can help you create a weight-loss plan that s right for you. This may include exercising more and making certain diet changes.    Don't have foods and drinks that may irritate the bladder. These can include alcohol and caffeinated drinks.    Quit smoking. Smoking and other tobacco use can lead to a long-term (chronic) cough that strains the pelvic floor muscles. Smoking  may also damage the bladder and urethra. Talk with your provider about treatments or methods you can use to quit smoking.    If drinking large amounts of fluid makes you have symptoms, you may be advised to limit your fluid intake. You may also be advised to drink most of your fluids during the day and to limit fluids at night.    If you re worried about urine leakage or accidents, you may wear absorbent pads to catch urine. Change the pads often. This helps reduce discomfort. It may also reduce the risk of skin or bladder infections.    Follow-up care  Follow up with your healthcare provider, or as directed. It may take some to find the right treatment for your problem. But healthy lifestyle changes can be made right away. These include such things as exercising on a regular basis, eating a healthy diet, losing weight (if needed), and quitting smoking. Your treatment plan may include special therapies or medicines. Certain procedures or surgery may also be options. Talk about any questions you have with your provider.   When to seek medical advice  Call the healthcare provider right away if any of these occur:    Fever of 100.4 F (38 C) or higher, or as directed by your provider    Bladder pain or fullness    Belly swelling    Nausea or vomiting    Back pain    Weakness, dizziness, or fainting  [employer] last reviewed this educational content on 1/1/2020 2000-2022 [employer]. All rights reserved. This information is not intended as a substitute for professional medical care. Always follow your healthcare professional's instructions.

## 2023-07-24 ENCOUNTER — NURSE TRIAGE (OUTPATIENT)
Dept: INTERNAL MEDICINE | Facility: CLINIC | Age: 88
End: 2023-07-24
Payer: MEDICARE

## 2023-07-24 NOTE — TELEPHONE ENCOUNTER
"Patient having L ear congestion and decreased hearing for past 1 week that has worsened after cerumen removal at 7/17 OV. Patient says she has tried irrigating L ear a little bit with warm water as recommended, it did not help. She would like to be seen again for ear evaluation because the ear congestion is bothersome.    Dispo: See in office within 3 days  Patient agrees and appt is scheduled with PCP.     Future Appointments 7/24/2023 - 1/20/2024        Date Visit Type Length Department Provider     7/27/2023  9:00 AM OFFICE VISIT 30 min  INTERNAL MEDICINE Veum, Julio Cesar HERNANDEZ MD    Location Instructions:     [facility] is in the [facility] at [address] in [place]. This just east of the [facility] [address] exit off of [address]. Free parking is available; access the lot from [address] [place] [address] or [facility].                          Reason for Disposition   Ear congestion present > 48 hours    Additional Information   Negative: Ear pain is main symptom   Negative: Hearing loss (complete or partial) is main symptom   Negative: Earwax is main concern   Negative: Has nasal allergies and they are acting up   Negative: Earache lasts > 1 hour   Negative: Pus or cloudy discharge from ear canal   Negative: Patient wants to be seen    Answer Assessment - Initial Assessment Questions  1. LOCATION: \"Which ear is involved?\"        L ear  2. SENSATION: \"Describe how the ear feels.\" (e.g. stuffy, full, plugged).\"       \"Feels stopped up and I can't hear anything but a low humming noise\"  3. ONSET:  \"When did the ear symptoms start?\"        7/17/23 after ear wax removal at OV  4. PAIN: \"Do you also have an earache?\" If Yes, ask: \"How bad is it?\" (Scale 1-10; or mild, moderate, severe)      No  5. CAUSE: \"What do you think is causing the ear congestion?\"      Bleeding from the ear wax removal 7/17/23  6. URI: \"Do you have a runny nose or cough?\"       No  7. NASAL ALLERGIES: \"Are there symptoms " "of hay fever, such as sneezing or a clear nasal discharge?\"      No  8. PREGNANCY: \"Is there any chance you are pregnant?\" \"When was your last menstrual period?\"      No    Protocols used: Ear - Congestion-A-OH    "

## 2023-07-27 ENCOUNTER — OFFICE VISIT (OUTPATIENT)
Dept: INTERNAL MEDICINE | Facility: CLINIC | Age: 88
End: 2023-07-27
Payer: MEDICARE

## 2023-07-27 VITALS
HEIGHT: 60 IN | DIASTOLIC BLOOD PRESSURE: 86 MMHG | SYSTOLIC BLOOD PRESSURE: 165 MMHG | HEART RATE: 77 BPM | OXYGEN SATURATION: 97 % | TEMPERATURE: 97.6 F | WEIGHT: 141.1 LBS | BODY MASS INDEX: 27.7 KG/M2

## 2023-07-27 DIAGNOSIS — H61.22 IMPACTED CERUMEN OF LEFT EAR: ICD-10-CM

## 2023-07-27 DIAGNOSIS — I10 HYPERTENSION, UNSPECIFIED TYPE: Primary | ICD-10-CM

## 2023-07-27 PROCEDURE — 69209 REMOVE IMPACTED EAR WAX UNI: CPT | Mod: LT | Performed by: INTERNAL MEDICINE

## 2023-07-27 PROCEDURE — 99214 OFFICE O/P EST MOD 30 MIN: CPT | Mod: 25 | Performed by: INTERNAL MEDICINE

## 2023-07-27 RX ORDER — LOSARTAN POTASSIUM 50 MG/1
50 TABLET ORAL DAILY
Qty: 90 TABLET | Refills: 3 | Status: SHIPPED | OUTPATIENT
Start: 2023-07-27 | End: 2024-07-02

## 2023-07-27 NOTE — PATIENT INSTRUCTIONS
Increase Losartan  to 25mg tab, 2 tabs (50mg) daily for blood pressure   until  you run out. Then change to a 50mg  tab,  tab daiuly  Get a blood pressure recheck   in  3-4 weeks (after back from [place]) at the  [facility] pharmacy. You will need to go to [email or web address] to schedule the blood pressure check appointment.  Ear wax removed left ear canal

## 2023-07-27 NOTE — PROGRESS NOTES
ASSESSMENT:   1. Hypertension, unspecified type  Uncontrolled.  Will increase losartan to 50 mg daily.  Blood pressure recheck in 3 to 4 weeks through pharmacy  - losartan (COZAAR) 50 MG tablet; Take 1 tablet (50 mg) by mouth daily  Dispense: 90 tablet; Refill: 3    2. Impacted cerumen of left ear  Initial obstructed left ear canal.  Resolved following irrigation/lavage by MD.  Hearing after procedure at baseline per patient and patient was very appreciative cerumen was able to be removed  - [facility] REMOVAL IMPACTED CERUMEN IRRIGATION/LVG UNILAT      PLAN:   Increase Losartan  to 25mg tab, 2 tabs (50mg) daily for blood pressure   until  you run out. Then change to a 50mg  tab,  tab daiuly  Get a blood pressure recheck   in  3-4 weeks (after back from [place]) at the  [facility] pharmacy. You will need to go to [email or web address] to schedule the blood pressure check appointment.  Ear wax removed left ear canal           (Chart documentation was completed, in part, with [employer] voice-recognition software. Even though reviewed, some grammatical, spelling, and word errors may remain.)    Julio Cesar Carrero MD  Internal Medicine Department  [facility]      Katie Samuels is a 87 year old, presenting for the following health issues:  Ear Problem      History of Present Illness       Reason for visit:  Ear plugged and buzzing  Symptom onset:  3-7 days ago  Symptom intensity:  Severe  Symptom progression:  Worsening  Had these symptoms before:  Yes  What makes it worse:  N/a  What makes it better:  N/a    She eats 2-3 servings of fruits and vegetables daily.She consumes 0 sweetened beverage(s) daily.She exercises with enough effort to increase her heart rate 9 or less minutes per day.  She exercises with enough effort to increase her heart rate 3 or less days per week.   She is taking medications regularly.       Most recent lab results reviewed with pt.      Acute decreased hearing  left ear.  Denies pain in the ear.  No fevers, chills or upper respiratory sinus pain, sore throat, cough.  Denies chest pain, shortness of breath, abdominal pain, headache, vision changes or side effects with medications.      Additional ROS:   Constitutional, HEENT, Cardiovascular, Pulmonary, GI and , Neuro, MSK and Psych review of systems/symptoms are otherwise negative or unchanged from previous, except as noted above.      OBJECTIVE:  BP (!) 165/86   Pulse 77   Temp 97.6  F (36.4  C) (Temporal)   Ht 1.524 m (5')   Wt 64 kg (141 lb 1.6 oz)   SpO2 97%   BMI 27.56 kg/m     Estimated body mass index is 27.56 kg/m  as calculated from the following:    Height as of this encounter: 1.524 m (5').    Weight as of this encounter: 64 kg (141 lb 1.6 oz).     HENT: Nose and mouth without ulcers or lesions.  Right TM and ear canal normal.  Left ear canal initially obstructed with cerumen.  Following verbal consent, MD irrigated and lavaged the left ear canal with water under standard technique and all cerumen removed.  No complications.  Following that, left ear canal was clear left TM appeared clear and patient's hearing normalized to baseline  Neck: no adenopathy. Thyroid normal to palpation. No bruits  Pulm: Lungs clear to auscultation   CV: Regular rates and rhythm  GI: Soft, nontender, Normal active bowel sounds, No hepatosplenomegaly or masses palpable  Ext: Peripheral pulses intact. No edema.

## 2023-08-21 ENCOUNTER — ALLIED HEALTH/NURSE VISIT (OUTPATIENT)
Dept: INTERNAL MEDICINE | Facility: CLINIC | Age: 88
End: 2023-08-21
Payer: MEDICARE

## 2023-08-21 VITALS — SYSTOLIC BLOOD PRESSURE: 152 MMHG | DIASTOLIC BLOOD PRESSURE: 82 MMHG

## 2023-08-21 DIAGNOSIS — Z01.30 BP CHECK: Primary | ICD-10-CM

## 2023-08-21 PROCEDURE — 99207 PR NO CHARGE NURSE ONLY: CPT | Performed by: INTERNAL MEDICINE

## 2023-08-21 NOTE — PROGRESS NOTES
Abril Aguilar was evaluated at [facility] on August 21, 2023 at which time her blood pressure was:    BP Readings from Last 3 Encounters:   08/21/23 (!) 152/82   07/27/23 (!) 165/86   07/17/23 (!) 196/79     Pulse Readings from Last 3 Encounters:   07/27/23 77   07/17/23 63   02/21/23 98       Reviewed lifestyle modifications for blood pressure control and reduction: including making healthy food choices, managing weight, getting regular exercise, smoking cessation, reducing alcohol consumption, monitoring blood pressure regularly.     Symptoms: None    BP Goal:< 140/90 mmHg    BP Assessment:  BP too high    Potential Reasons for BP too high: Dose of BP medication too low    BP Follow-Up Plan: Recheck BP in 30 days at pharmacy    Recommendation to Provider: Recheck BP at pharmacy in 30 days and if still elevated at that time, adjust therapy.    Note completed by: Nasim Lipscomb, PharmD  [facility]  [phone]

## 2023-09-04 NOTE — CONFIDENTIAL NOTE
Agree with plan as given to patient by pharmacist for recheck 1 month.  If blood pressure sarah greater than 140/80, will increase losartan dosage to 100 mg daily

## 2023-10-04 ENCOUNTER — TELEPHONE (OUTPATIENT)
Dept: INTERNAL MEDICINE | Facility: CLINIC | Age: 88
End: 2023-10-04

## 2023-10-04 ENCOUNTER — OFFICE VISIT (OUTPATIENT)
Dept: URGENT CARE | Facility: URGENT CARE | Age: 88
End: 2023-10-04
Payer: MEDICARE

## 2023-10-04 VITALS
WEIGHT: 140.2 LBS | SYSTOLIC BLOOD PRESSURE: 186 MMHG | TEMPERATURE: 97.2 F | DIASTOLIC BLOOD PRESSURE: 96 MMHG | BODY MASS INDEX: 27.38 KG/M2 | OXYGEN SATURATION: 95 % | HEART RATE: 83 BPM

## 2023-10-04 DIAGNOSIS — R30.0 DYSURIA: ICD-10-CM

## 2023-10-04 DIAGNOSIS — I10 UNCONTROLLED HYPERTENSION: ICD-10-CM

## 2023-10-04 DIAGNOSIS — N30.01 ACUTE CYSTITIS WITH HEMATURIA: Primary | ICD-10-CM

## 2023-10-04 LAB
ALBUMIN UR-MCNC: NEGATIVE MG/DL
APPEARANCE UR: CLEAR
BACTERIA #/AREA URNS HPF: ABNORMAL /HPF
BILIRUB UR QL STRIP: NEGATIVE
COLOR UR AUTO: YELLOW
GLUCOSE UR STRIP-MCNC: NEGATIVE MG/DL
HGB UR QL STRIP: ABNORMAL
KETONES UR STRIP-MCNC: NEGATIVE MG/DL
LEUKOCYTE ESTERASE UR QL STRIP: ABNORMAL
NITRATE UR QL: NEGATIVE
PH UR STRIP: 6 [PH] (ref 5–7)
RBC #/AREA URNS AUTO: ABNORMAL /HPF
SP GR UR STRIP: 1.01 (ref 1–1.03)
SQUAMOUS #/AREA URNS AUTO: ABNORMAL /LPF
UROBILINOGEN UR STRIP-ACNC: 0.2 E.U./DL
WBC #/AREA URNS AUTO: ABNORMAL /HPF
WBC CLUMPS #/AREA URNS HPF: PRESENT /HPF

## 2023-10-04 PROCEDURE — 87186 SC STD MICRODIL/AGAR DIL: CPT | Performed by: PHYSICIAN ASSISTANT

## 2023-10-04 PROCEDURE — 99214 OFFICE O/P EST MOD 30 MIN: CPT | Performed by: PHYSICIAN ASSISTANT

## 2023-10-04 PROCEDURE — 81001 URINALYSIS AUTO W/SCOPE: CPT | Performed by: PHYSICIAN ASSISTANT

## 2023-10-04 PROCEDURE — 87086 URINE CULTURE/COLONY COUNT: CPT | Performed by: PHYSICIAN ASSISTANT

## 2023-10-04 RX ORDER — CEPHALEXIN 500 MG/1
500 CAPSULE ORAL 3 TIMES DAILY
Qty: 21 CAPSULE | Refills: 0 | Status: SHIPPED | OUTPATIENT
Start: 2023-10-04 | End: 2023-10-11

## 2023-10-04 NOTE — TELEPHONE ENCOUNTER
Patient Contact    Attempt # 1    Was call answered?  No.  Left message on voicemail with information to call me back.    On call back, please relay PCP recommendations to patient.      done

## 2023-10-04 NOTE — TELEPHONE ENCOUNTER
Received a call from the patient stating she was seen in [facility] this morning for a bladder infection. During the visit, patient's BP was checked and came back at 185/108. Patient was advised by the  provider to go home and take her Losartan 50 mg tablet (patient normally takes the medication in the evening but took at 11 AM today). Patient states she went to the pharmacy after taking the medication and her BP was reading high still (patient could not recall the numbers). Patient then proceeded to buy a wrist monitor and last 2 blood pressure readings are 119/70 and 116/66. No blurred vision, no chest pain, and no headache.     Patient is wondering if she should be doing anything differently? Schedule appointment at [facility] tomorrow or Friday to have her BP checked at the clinic?    Will route to PCP for review and recommendations.     Can we leave a detailed message on this number? YES  Phone number patient can be reached at: Home number on file [phone] (home)    Patience Whitt RN  [facility]3] Triage

## 2023-10-04 NOTE — PATIENT INSTRUCTIONS
Tolerated keflex last UTI    Follow up immediately with fever, sense of illness, vomiting    Take BP med and monitor pressure improvement    Follow up with PCP team today via mychart/telephone

## 2023-10-04 NOTE — PROGRESS NOTES
SUBJECTIVE:   Abril Aguilar is a 87 year old female who  presents today for a possible UTI. Symptoms of dysuria and frequency have been going on for 3day(s).  Hematuria no.  still presentand moderate.  There is no history of fever, chills, nausea or vomiting.  No history of vaginal or penile discharge. This patient does have a history of urinary tract infections. 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, without mention of thyrotoxic crisis or storm 1970s    Resolved    Vitamin D deficiency      Current Outpatient Medications   Medication Sig Dispense Refill    Aspirin 81 MG CAPS       cephALEXin (KEFLEX) 500 MG capsule Take 1 capsule (500 mg) by mouth 3 times daily for 7 days 21 capsule 0    Cholecalciferol (VITAMIN D) 2000 UNITS tablet Take 2,000 Units by mouth daily 100 tablet 3    fluticasone-salmeterol (ADVAIR-HFA) 115-21 MCG/ACT inhaler Inhale 2 puffs into the lungs 2 times daily      losartan (COZAAR) 50 MG tablet Take 1 tablet (50 mg) by mouth daily 90 tablet 3    OCUVITE OR TABS 1 daily  0     Social History     Tobacco Use    Smoking status: Never    Smokeless tobacco: Never   Substance Use Topics    Alcohol use: No       ROS:   Review of systems negative except as stated above.    OBJECTIVE:  BP (!) 186/96   Pulse 83   Temp 97.2  F (36.2  C) (Tympanic)   Wt 63.6 kg (140 lb 3.2 oz)   SpO2 95%   BMI 27.38  kg/m    GENERAL APPEARANCE: healthy, alert and no distress  RESP: lungs clear to auscultation - no rales, rhonchi or wheezes  CV: regular rates and rhythm, normal S1 S2, no murmur noted  BACK: No CVA tenderness  SKIN: no suspicious lesions or rashes  NEURO: Normal strength and tone, sensory exam grossly normal,  normal speech and mentation      Results for orders placed or performed in visit on 10/04/23   UA Macroscopic with reflex to Microscopic and Culture - Clinic Collect     Status: Abnormal    Specimen: Urine, Midstream   Result Value Ref Range    Color Urine Yellow Colorless, Straw, Light Yellow, Yellow    Appearance Urine Clear Clear    Glucose Urine Negative Negative mg/dL    Bilirubin Urine Negative Negative    Ketones Urine Negative Negative mg/dL    Specific Gravity Urine 1.010 1.003 - 1.035    Blood Urine Small (A) Negative    pH Urine 6.0 5.0 - 7.0    Protein Albumin Urine Negative Negative mg/dL    Urobilinogen Urine 0.2 0.2, 1.0 E.U./dL    Nitrite Urine Negative Negative    Leukocyte Esterase Urine Large (A) Negative   Urine Microscopic Exam     Status: Abnormal   Result Value Ref Range    Bacteria Urine Many (A) None Seen /HPF    RBC Urine 5-10 (A) 0-2 /HPF /HPF    WBC Urine  (A) 0-5 /HPF /HPF    Squamous Epithelials Urine Few (A) None Seen /LPF    WBC Clumps Urine Present (A) None Seen /HPF       ASSESSMENT:   (N30.01) Acute cystitis with hematuria  (primary encounter diagnosis)  Plan: cephALEXin (KEFLEX) 500 MG capsule      (R30.0) Dysuria  Plan: UA Macroscopic with reflex to Microscopic and         Culture - Clinic Collect, Urine Microscopic         Exam, Urine Culture    (I10) Uncontrolled hypertension  Comment: typically takes BP med at bedtime  Plan: will take BP med at home and check BP for improvement  Will follow up with PCP today regarding elevatred readings and possible med adjustment    Red flags of UTIs and hypertensive emergencies and emergent follow up discussed, and understood  by patient  Follow up with PCP if symptoms worsen or fail to improve

## 2023-10-04 NOTE — TELEPHONE ENCOUNTER
Spoke with pt, given information per Dr. Carrero.    BP elevated was likely related to stress response with infection and UC bvisit. Pt took her Losartan usual dose and BP now fine. Continue current Losartan dosing at 50mg daily. OK to take again tomorrow in the evening as usual. Does not need a dose today since already too med and BP fine now     Yoli Braun RN

## 2023-10-04 NOTE — TELEPHONE ENCOUNTER
BP elevated was likely related to stress response with infection and UC bvisit.  Pt took her Losartan usual dose and BP  now fine. Continue current Losartan dosing at 50mg daily. OK to take again tomorrow in the evening as usual. Does not need a dose today since already too med and BP fine now

## 2023-10-06 LAB
BACTERIA UR CULT: ABNORMAL
BACTERIA UR CULT: ABNORMAL

## 2024-02-22 ENCOUNTER — ALLIED HEALTH/NURSE VISIT (OUTPATIENT)
Dept: INTERNAL MEDICINE | Facility: CLINIC | Age: 89
End: 2024-02-22
Payer: MEDICARE

## 2024-02-22 VITALS — DIASTOLIC BLOOD PRESSURE: 90 MMHG | SYSTOLIC BLOOD PRESSURE: 144 MMHG

## 2024-02-22 DIAGNOSIS — Z01.30 BP CHECK: Primary | ICD-10-CM

## 2024-02-22 PROCEDURE — 99207 PR NO CHARGE NURSE ONLY: CPT | Performed by: INTERNAL MEDICINE

## 2024-02-22 NOTE — PROGRESS NOTES
Abril Aguilar was evaluated at [facility] on February 22, 2024 at which time her blood pressure was:    BP Readings from Last 1 Encounters:   02/22/24 (!) 144/90     No data recorded      Reviewed lifestyle modifications for blood pressure control and reduction: including making healthy food choices, managing weight, getting regular exercise, smoking cessation, reducing alcohol consumption, monitoring blood pressure regularly.     Symptoms: None    BP Goal: not at goal today     BP Assessment:  BP too high    Potential Reasons for BP too high: Caffeine use within past 30 minutes    BP Follow-Up Plan: Recheck BP in 30 days at pharmacy    Recommendation to Provider: BP elevated today, patient will come back to the pharmacy in 30 days to recheck BP.     Note completed by: James Tovar

## 2024-05-22 ENCOUNTER — ALLIED HEALTH/NURSE VISIT (OUTPATIENT)
Dept: INTERNAL MEDICINE | Facility: CLINIC | Age: 89
End: 2024-05-22
Payer: MEDICARE

## 2024-05-22 VITALS — SYSTOLIC BLOOD PRESSURE: 136 MMHG | DIASTOLIC BLOOD PRESSURE: 80 MMHG

## 2024-05-22 DIAGNOSIS — Z01.30 BP CHECK: Primary | ICD-10-CM

## 2024-05-22 PROCEDURE — 99207 PR NO CHARGE NURSE ONLY: CPT | Performed by: INTERNAL MEDICINE

## 2024-05-22 NOTE — PROGRESS NOTES
Abril Aguilar was evaluated at [facility] on May 22, 2024 at which time her blood pressure was:    BP Readings from Last 3 Encounters:   05/22/24 136/80   02/22/24 (!) 144/90   10/04/23 (!) 186/96     Pulse Readings from Last 3 Encounters:   10/04/23 83   07/27/23 77   07/17/23 63       Reviewed lifestyle modifications for blood pressure control and reduction: including making healthy food choices, managing weight, getting regular exercise, smoking cessation, reducing alcohol consumption, monitoring blood pressure regularly.     Symptoms: None    BP Goal:< 140/90 mmHg    BP Assessment:  BP at goal    Potential Reasons for BP too high: Physical activity within past 30 minutes    BP Follow-Up Plan: Recheck BP in 6 months at pharmacy    Recommendation to Provider: Continue current medication regimen.     Note completed by: Misa Luna, PharmD student

## 2024-06-30 DIAGNOSIS — I10 HYPERTENSION, UNSPECIFIED TYPE: ICD-10-CM

## 2024-07-02 RX ORDER — LOSARTAN POTASSIUM 50 MG/1
50 TABLET ORAL DAILY
Qty: 90 TABLET | Refills: 0 | Status: SHIPPED | OUTPATIENT
Start: 2024-07-02 | End: 2024-08-11

## 2024-07-17 ENCOUNTER — TRANSFERRED RECORDS (OUTPATIENT)
Dept: HEALTH INFORMATION MANAGEMENT | Facility: CLINIC | Age: 89
End: 2024-07-17
Payer: COMMERCIAL

## 2024-07-19 ENCOUNTER — OFFICE VISIT (OUTPATIENT)
Dept: INTERNAL MEDICINE | Facility: CLINIC | Age: 89
End: 2024-07-19
Payer: MEDICARE

## 2024-07-19 VITALS
WEIGHT: 140.7 LBS | BODY MASS INDEX: 27.62 KG/M2 | HEART RATE: 77 BPM | DIASTOLIC BLOOD PRESSURE: 80 MMHG | SYSTOLIC BLOOD PRESSURE: 160 MMHG | TEMPERATURE: 97.6 F | HEIGHT: 60 IN | OXYGEN SATURATION: 94 %

## 2024-07-19 DIAGNOSIS — Z00.00 MEDICARE ANNUAL WELLNESS VISIT, SUBSEQUENT: ICD-10-CM

## 2024-07-19 DIAGNOSIS — J44.9 CHRONIC OBSTRUCTIVE PULMONARY DISEASE, UNSPECIFIED COPD TYPE (H): ICD-10-CM

## 2024-07-19 DIAGNOSIS — N39.41 URGE INCONTINENCE OF URINE: ICD-10-CM

## 2024-07-19 DIAGNOSIS — I10 HYPERTENSION, UNSPECIFIED TYPE: ICD-10-CM

## 2024-07-19 DIAGNOSIS — M19.91 PRIMARY OSTEOARTHRITIS, UNSPECIFIED SITE: ICD-10-CM

## 2024-07-19 LAB
ALBUMIN SERPL BCG-MCNC: 4.3 G/DL (ref 3.5–5.2)
ALP SERPL-CCNC: 64 U/L (ref 40–150)
ALT SERPL W P-5'-P-CCNC: 15 U/L (ref 0–50)
ANION GAP SERPL CALCULATED.3IONS-SCNC: 11 MMOL/L (ref 7–15)
AST SERPL W P-5'-P-CCNC: 27 U/L (ref 0–45)
BILIRUB SERPL-MCNC: 0.3 MG/DL
BUN SERPL-MCNC: 15.4 MG/DL (ref 8–23)
CALCIUM SERPL-MCNC: 9.1 MG/DL (ref 8.8–10.4)
CHLORIDE SERPL-SCNC: 97 MMOL/L (ref 98–107)
CREAT SERPL-MCNC: 0.82 MG/DL (ref 0.51–0.95)
EGFRCR SERPLBLD CKD-EPI 2021: 69 ML/MIN/1.73M2
ERYTHROCYTE [DISTWIDTH] IN BLOOD BY AUTOMATED COUNT: 12.9 % (ref 10–15)
GLUCOSE SERPL-MCNC: 98 MG/DL (ref 70–99)
HCO3 SERPL-SCNC: 25 MMOL/L (ref 22–29)
HCT VFR BLD AUTO: 43.6 % (ref 35–47)
HGB BLD-MCNC: 14 G/DL (ref 11.7–15.7)
MCH RBC QN AUTO: 29.4 PG (ref 26.5–33)
MCHC RBC AUTO-ENTMCNC: 32.1 G/DL (ref 31.5–36.5)
MCV RBC AUTO: 91 FL (ref 78–100)
PLATELET # BLD AUTO: 276 10E3/UL (ref 150–450)
POTASSIUM SERPL-SCNC: 4.4 MMOL/L (ref 3.4–5.3)
PROT SERPL-MCNC: 7.9 G/DL (ref 6.4–8.3)
RBC # BLD AUTO: 4.77 10E6/UL (ref 3.8–5.2)
SODIUM SERPL-SCNC: 133 MMOL/L (ref 135–145)
WBC # BLD AUTO: 9.5 10E3/UL (ref 4–11)

## 2024-07-19 PROCEDURE — 85027 COMPLETE CBC AUTOMATED: CPT | Performed by: INTERNAL MEDICINE

## 2024-07-19 PROCEDURE — G0439 PPPS, SUBSEQ VISIT: HCPCS | Performed by: INTERNAL MEDICINE

## 2024-07-19 PROCEDURE — 36415 COLL VENOUS BLD VENIPUNCTURE: CPT | Performed by: INTERNAL MEDICINE

## 2024-07-19 PROCEDURE — 99214 OFFICE O/P EST MOD 30 MIN: CPT | Mod: 25 | Performed by: INTERNAL MEDICINE

## 2024-07-19 PROCEDURE — 80053 COMPREHEN METABOLIC PANEL: CPT | Performed by: INTERNAL MEDICINE

## 2024-07-19 RX ORDER — AZELAIC ACID 0.15 G/G
GEL TOPICAL
COMMUNITY
Start: 2024-07-17

## 2024-07-19 RX ORDER — ALBUTEROL SULFATE 90 UG/1
2 AEROSOL, METERED RESPIRATORY (INHALATION) EVERY 6 HOURS PRN
COMMUNITY
Start: 2024-07-19

## 2024-07-19 SDOH — HEALTH STABILITY: PHYSICAL HEALTH: ON AVERAGE, HOW MANY DAYS PER WEEK DO YOU ENGAGE IN MODERATE TO STRENUOUS EXERCISE (LIKE A BRISK WALK)?: 0 DAYS

## 2024-07-19 ASSESSMENT — SOCIAL DETERMINANTS OF HEALTH (SDOH): HOW OFTEN DO YOU GET TOGETHER WITH FRIENDS OR RELATIVES?: TWICE A WEEK

## 2024-07-19 NOTE — PATIENT INSTRUCTIONS
Continue current medications  Labs today as ordered  Get a blood pressure recheck   in  2 weeks at the  [facility] pharmacy. You will need to go to [email or web address] to schedule the blood pressure check appointment.  Reduce caffeine intake  if urine bothersome  Voltaren  gel affect joints as needed. Over the counter   Pt was informed regarding extra E&M billing for management of new or established medical issues not related to today's wellness/screening visit

## 2024-07-19 NOTE — PROGRESS NOTES
Preventive Care Visit  [facility]  Julio Cesar Carrero MD, Internal Medicine  Jul 19, 2024      ASSESSMENT:    1. Medicare annual wellness visit, subsequent  Screening labs as ordered.  Patient declines DEXA screening, COVID/influenza vaccinations or breast cancer screening.  - Comprehensive metabolic panel; Future  - CBC with platelets; Future  - Comprehensive metabolic panel  - CBC with platelets    2. Hypertension, unspecified type  On losartan.  Blood pressure elevated today.  Patient will get blood pressure rechecked through pharmacy in 2 weeks.  If remains greater than 135/80, will adjust losartan dosage upward  - Comprehensive metabolic panel; Future  - CBC with platelets; Future  - Comprehensive metabolic panel  - CBC with platelets    3. Chronic obstructive pulmonary disease, unspecified COPD type (H)  Controlled.  Continue Advair and albuterol per pulmonary instruction  - CBC with platelets; Future  - CBC with platelets    4. Primary osteoarthritis, unspecified site  Fairly asymptomatic except for some tenderness bilateral thumb bases.  May use topical Voltaren as needed    5. Urge incontinence of urine  Counseled regarding negative effects of caffeine on urine frequency.  Patient states she will continue her consumption of coffee and tea. Declines medication or pelvic physical therapy to treat symptoms.  Will schedule more frequent bathroom breaks.  Continue use of a pad in underwear.   If pt makes lifestyle changes in the future with reduction of caffeine and symptoms persist and willing to start treatment, pt will inform MD      PLAN:  Continue current medications  Labs today as ordered  Get a blood pressure recheck   in  2 weeks at the  [facility] pharmacy. You will need to go to [email or web address] to schedule the blood pressure check appointment.  Reduce caffeine intake  if urine bothersome  Voltaren  gel affect joints as needed. Over the counter   Pt was informed regarding  extra E&M billing for management of new or established medical issues not related to today's wellness/screening visit          Katie Samuels is a 88 year old, presenting for the following:  Wellness Visit  And follow-up medical issues as above    Health Care Directive  Patient has a Health Care Directive on file      HPI         7/19/2024   General Health   How would you rate your overall physical health? Excellent   Feel stress (tense, anxious, or unable to sleep) Not at all            7/19/2024   Nutrition   Diet: Regular (no restrictions)            7/19/2024   Exercise   Days per week of moderate/strenous exercise 0 days      (!) EXERCISE CONCERN      7/19/2024   Social Factors   Frequency of gathering with friends or relatives Twice a week   Worry food won't last until get money to buy more No   Food not last or not have enough money for food? No   Do you have housing? (Housing is defined as stable permanent housing and does not include staying ouside in a car, in a tent, in an abandoned building, in an overnight shelter, or couch-surfing.) Yes   Are you worried about losing your housing? No   Lack of transportation? No   Unable to get utilities (heat,electricity)? No            7/19/2024   Fall Risk   Fallen 2 or more times in the past year? No   Trouble with walking or balance? No             7/19/2024   Activities of Daily Living- Home Safety   Needs help with the following daily activites None of the above   Safety concerns in the home No grab bars in the bathroom            7/19/2024   Dental   Dentist two times every year? (!) NO            7/19/2024   Hearing Screening   Hearing concerns? None of the above            7/19/2024   Driving Risk Screening   Patient/family members have concerns about driving No            7/19/2024   General Alertness/Fatigue Screening   Have you been more tired than usual lately? No            7/19/2024   Urinary Incontinence Screening   Bothered by leaking urine in  past 6 months Yes            7/19/2024   TB Screening   Were you born outside of the [place]? No            Today's PHQ-2 Score:       7/19/2024     9:33 AM   PHQ-2 ( 1999 [employer])   Q1: Little interest or pleasure in doing things 0   Q2: Feeling down, depressed or hopeless 0   PHQ-2 Score 0   Q1: Little interest or pleasure in doing things Not at all   Q2: Feeling down, depressed or hopeless Not at all   PHQ-2 Score 0           7/19/2024   Substance Use   Alcohol more than 3/day or more than 7/wk Not Applicable   Do you have a current opioid prescription? No   How severe/bad is pain from 1 to 10? 0/10 (No Pain)   Do you use any other substances recreationally? No        Social History     Tobacco Use    Smoking status: Never    Smokeless tobacco: Never   Vaping Use    Vaping status: Never Used   Substance Use Topics    Alcohol use: No    Drug use: No           Mammogram Screening - Mammography discussed and declined    DEXA last May 2015. Pt refuses any potential future  treatment so deferring further testing         Current providers sharing in care for this patient include:  Patient Care Team:  Julio Cesar Carrero MD as PCP - General (Internal Medicine)  Julio Cesar Carrero MD as Assigned PCP    The following health maintenance items are reviewed in Epic and correct as of today:  Health Maintenance   Topic Date Due    ANNUAL REVIEW OF HM ORDERS  Never done    COPD ACTION PLAN  Never done    RSV VACCINE (Pregnancy & 60+) (1 - 1-dose 60+ series) Never done    ZOSTER IMMUNIZATION (2 of 3) 08/24/2009    LIPID  08/06/2021    COVID-19 Vaccine (4 - 2023-24 season) 09/01/2023    MEDICARE ANNUAL WELLNESS VISIT  07/17/2024    INFLUENZA VACCINE (1) 09/01/2024    DTAP/TDAP/TD IMMUNIZATION (2 - Td or Tdap) 05/27/2025    FALL RISK ASSESSMENT  07/19/2025    ADVANCE CARE PLANNING  07/19/2028    DEXA  05/27/2030    SPIROMETRY  Completed    PHQ-2 (once per calendar year)  Completed    Pneumococcal Vaccine: 65+ Years  Completed    IPV IMMUNIZATION   Aged Out    HPV IMMUNIZATION  Aged Out    MENINGITIS IMMUNIZATION  Aged Out    RSV MONOCLONAL ANTIBODY  Aged Out         Review of Systems  CONSTITUTIONAL: NEGATIVE for fever, chills. Weight up 4 pounds  INTEGUMENTARY/SKIN: NEGATIVE for worrisome rashes, moles or lesions  EYES: POSITIVE  for  MD. 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 Poor vision right eye and not being treated  ENT/MOUTH: NEGATIVE for ear, mouth and throat problems  RESP: NEGATIVE for significant cough or shortness of breath currently.  Saw Pulm  earlier this week with PFTs. No report to review. Using Advair few times/week. Last Albuterol    2 weeks   BREAST: NEGATIVE for masses, tenderness or discharge  CV: NEGATIVE for chest pain, palpitations or peripheral edema  GI: NEGATIVE for nausea, abdominal pain, heartburn, or change in bowel habits  : NEGATIVE for frequency, dysuria, or hematuria. 2 coffee in AM. Using a pad.  Patient declines medication therapy or pelvic physical therapy for incontinence  MUSCULOSKELETAL: NEGATIVE for significant arthralgias or myalgia except some tenderness at the bases bilaterally.  No acute swelling  NEURO: NEGATIVE for weakness, dizziness or paresthesias  ENDOCRINE: NEGATIVE for temperature intolerance   HEME: NEGATIVE for bleeding problems  PSYCHIATRIC: NEGATIVE for changes in mood or affect. Hx insomnia.  Trouble falling asleep.  Takes Intermittent Tylenol PM. Took 1/2 tab last night and slept well last night and denies any AM hangover feeling with med     Objective    Exam  BP (!) 160/80   Pulse 77   Temp 97.6  F (36.4  C) (Temporal)   Ht 1.524 m (5')   Wt 63.8 kg (140 lb 11.2 oz)   SpO2 94%   BMI 27.48 kg/m     Estimated body mass index is 27.48 kg/m  as calculated from the following:    Height as of this encounter: 1.524 m (5').    Weight as of this encounter: 63.8 kg (140 lb 11.2 oz).    Physical Exam  General appearance - healthy, alert, no distress  Skin - No rashes or lesions.  Head -  normocephalic, atraumatic  Eyes - KURT, EOMI, fundi exam with nondilated pupils negative.  Ears - External ears normal. Canals clear. TM's normal.  Nose/Sinuses - Nares normal. Septum midline. Mucosa normal. No drainage or sinus tenderness.  Oropharynx - No erythema, no adenopathy, no exudates.  Neck - Supple without adenopathy or thyromegaly. No bruits.  Lungs - Clear to auscultation without wheezes/rhonchi.  Heart - Regular rate and rhythm without murmurs, clicks, or gallops.  Nodes - No supraclavicular, axillary, or inguinal adenopathy palpable.  Breasts - deferred  Abdomen - Abdomen soft, non-tender. BS normal. No masses or hepatosplenomegaly palpable. No bruits.  Extremities -No cyanosis, clubbing or edema.    Musculoskeletal - Spine ROM normal. Muscular strength intact.  Tenderness palpation bilateral thumb bases.  Equivocal Finklestein's test bilaterally.  DIP and PIP joints of hands with mild osteoarthritis thickening consistent with osteoarthritis  Peripheral pulses - radial=4/4, femoral=4/4, posterior tibial=4/4, dorsalis pedis=4/4,  Neuro - Gait normal. Reflexes normal and symmetric. Sensation grossly WNL.  Genital/Rectal - deferred          7/19/2024   Mini Cog   Clock Draw Score 2 Normal   3 Item Recall 3 objects recalled   Mini Cog Total Score 5           Signed Electronically by: Julio Cesar Carrero MD

## 2024-08-06 ENCOUNTER — ALLIED HEALTH/NURSE VISIT (OUTPATIENT)
Dept: INTERNAL MEDICINE | Facility: CLINIC | Age: 89
End: 2024-08-06
Payer: MEDICARE

## 2024-08-06 VITALS — SYSTOLIC BLOOD PRESSURE: 142 MMHG | DIASTOLIC BLOOD PRESSURE: 70 MMHG | HEART RATE: 73 BPM

## 2024-08-06 DIAGNOSIS — I10 HYPERTENSION, UNSPECIFIED TYPE: ICD-10-CM

## 2024-08-06 DIAGNOSIS — Z01.30 BP CHECK: Primary | ICD-10-CM

## 2024-08-06 PROCEDURE — 99207 PR NO CHARGE NURSE ONLY: CPT | Performed by: INTERNAL MEDICINE

## 2024-08-06 NOTE — Clinical Note
Abril Aguilar was evaluated in a pharmacy today at which time her blood pressure was noted to be elevated.  She  has been advised to follow up with her primary provider or with a nurse only visit. We are also routing this message to her primary provider as an FYI.

## 2024-08-06 NOTE — PROGRESS NOTES
Abril Aguilar was evaluated at [facility] on August 6, 2024 at which time her blood pressure was:    BP Readings from Last 1 Encounters:   08/06/24 (!) 142/70     No data recorded      Reviewed lifestyle modifications for blood pressure control and reduction: including making healthy food choices, managing weight, getting regular exercise, smoking cessation, reducing alcohol consumption, monitoring blood pressure regularly.     Symptoms: None    BP Goal:< 140/90 mmHg    BP Assessment:  BP too high    Potential Reasons for BP too high: Anxiety    BP Follow-Up Plan: Recheck BP in 30 days in the pharmacy.Date of next scheduled BP visit or call [phone]    Recommendation to Provider: Abril Aguilar was evaluated in a pharmacy today at which time her blood pressure was noted to be elevated.  She  has been advised to follow up with her primary provider or with a nurse only visit. We are also routing this message to her primary provider as an FYI.     Note completed by: Abel Balderas

## 2024-08-11 RX ORDER — LOSARTAN POTASSIUM 50 MG/1
50 TABLET ORAL DAILY
Qty: 90 TABLET | Refills: 1 | Status: SHIPPED | OUTPATIENT
Start: 2024-08-11

## 2024-10-05 ENCOUNTER — TELEPHONE (OUTPATIENT)
Dept: INTERNAL MEDICINE | Facility: CLINIC | Age: 89
End: 2024-10-05
Payer: MEDICARE

## 2024-10-05 NOTE — TELEPHONE ENCOUNTER
Reason for Call:  Appointment Request    Patient requesting this type of appt:  Office Visit    Requested provider: Julio Cesar Carrero or anyone available at the [facility]    Reason patient unable to be scheduled: Not within requested timeframe    When does patient want to be seen/preferred time:  asap    Comments: Pt is having pain in shoulders, legs & thighs, especially in the morning & wants to see someone. Said she did some reading & thinks it might be rheumatism but wants a doctor to check it out. We scheduled an appt for November but pt would like to try to get in sooner with either PCP or anyone at the clinic. Said if she doesn't hear back & it gets too bad she might go into urgent care.     Could we send this information to you in [facility] or would you prefer to receive a phone call?:   Patient would prefer a phone call   Okay to leave a detailed message?: Yes at Cell number on file:    Telephone Information:   Mobile [phone]   Said home phone is not working currently.     Call taken on 10/5/2024 at 8:13 AM by Yesika Luciano

## 2024-10-06 ENCOUNTER — OFFICE VISIT (OUTPATIENT)
Dept: URGENT CARE | Facility: URGENT CARE | Age: 89
End: 2024-10-06
Payer: MEDICARE

## 2024-10-06 VITALS
DIASTOLIC BLOOD PRESSURE: 99 MMHG | TEMPERATURE: 97.6 F | HEART RATE: 89 BPM | SYSTOLIC BLOOD PRESSURE: 176 MMHG | OXYGEN SATURATION: 98 %

## 2024-10-06 DIAGNOSIS — M35.3 PMR (POLYMYALGIA RHEUMATICA) (H): Primary | ICD-10-CM

## 2024-10-06 LAB
CRP SERPL-MCNC: 51.9 MG/L
ERYTHROCYTE [SEDIMENTATION RATE] IN BLOOD BY WESTERGREN METHOD: 32 MM/HR (ref 0–30)

## 2024-10-06 PROCEDURE — 36415 COLL VENOUS BLD VENIPUNCTURE: CPT | Performed by: FAMILY MEDICINE

## 2024-10-06 PROCEDURE — 99213 OFFICE O/P EST LOW 20 MIN: CPT | Performed by: FAMILY MEDICINE

## 2024-10-06 PROCEDURE — 85652 RBC SED RATE AUTOMATED: CPT | Performed by: FAMILY MEDICINE

## 2024-10-06 PROCEDURE — 86140 C-REACTIVE PROTEIN: CPT | Performed by: FAMILY MEDICINE

## 2024-10-06 RX ORDER — PREDNISONE 5 MG/1
TABLET ORAL
Qty: 35 TABLET | Refills: 0 | Status: SHIPPED | OUTPATIENT
Start: 2024-10-06

## 2024-10-06 NOTE — PROGRESS NOTES
Abril Aguilar is a 88 year old female who comes in today for a few weeks of pain    Shoulder areas  bilateral and backs of both thighs    No fever/ chills    No illness symptoms     Couldn't get in to primary clinic for appointment     Breathing okay    Full physical not done     Mentation and affect are fine    No tremor of speech or extremity    Patient has no point tenderness over shoulders, neck , upper back, upper chest     Range of motion of neck an shoulders okay    Patient has some OA changes of hands/ fingers    No point tenderness over hips/ pelvis/ thighs    No pain on int/ ext rotation of hips bilat      Esr mildy elevated   Crp pending    ASSESSMENT / PLAN:  (M35.3) PMR (polymyalgia rheumatica) (H)  (primary encounter diagnosis)  Comment: this is most likely dx.  Discussed in detail.  We agreed on very low dose prednisone as therapeutic trial.  Response to prednisone will help clarify dx.  Start with one week at 10 mg daily then go to 5 mg daily.  Follow up in a few weeks in clinic as planned.  Be seen promptly if symptoms acutely worsen.  Patient agreed with plan.   Plan: ESR: Erythrocyte sedimentation rate, CRP,         inflammation, predniSONE (DELTASONE) 5 MG         tablet               I reviewed the patient's medications, allergies, medical history, family history, and social history.    Tony Murray MD

## 2024-10-06 NOTE — PATIENT INSTRUCTIONS
Check mychart for results    Start prednisone 10 mg daily for one week then 5 mg daily after that    Follow up in a few weeks as planned    Be seen sooner if needed

## 2024-10-06 NOTE — RESULT ENCOUNTER NOTE
The inflammation tests are elevated, consistent with the polymyalgia rheumatica.    Treatment and follow up as we discussed.    Tony Murray MD

## 2024-10-07 NOTE — TELEPHONE ENCOUNTER
Per chart review, pt was seen in UC for this concern on 10/6.    Closing encounter.     Thank you,  Carli James RN

## 2024-10-31 NOTE — PROGRESS NOTES
Assessment & Plan     (M35.3) Polymyalgia rheumatica (H)  (primary encounter diagnosis)  Comment: Repeat labs today.    Given response to 10 mg prednisone dose, which improved her symptoms but was not entirely sufficient, and return of symptoms with prednisone taper, will proceed with longer term prednisone taper starting at a slightly higher dose of 15 mg.  Prednisone taper was discussed at length with patient, including duration of each dose, overall duration of the taper, and risks associated with long-term prednisone.    Current prednisone plan:  15 mg x2 weeks  12.5 mg x2 weeks  10 mg x4 weeks  Reduce by 1 mg every 4 weeks until taper is complete.  Reviewed symptoms of giant cell arteritis; she will let us know right away if she experiences any of these symptoms.  Referral to rheumatology for assistance with management, given her age.  Plan: Comprehensive metabolic panel, CBC with         platelets, TSH with free T4 reflex, Anti         Nuclear Kiki IgG by IFA with Reflex, Rheumatoid         factor, Erythrocyte sedimentation rate auto,         CRP inflammation, Vitamin D Deficiency, CK         total, Cyclic Citrullinated Peptide Antibody         IgG, Adult Rheumatology  Referral,         predniSONE (DELTASONE) 5 MG tablet    (Z79.899) Encounter for long-term (current) use of medications  Comment: Labs ordered for screening.  Plan: TSH with free T4 reflex, Vitamin D Deficiency       BMI  Estimated body mass index is 26.91 kg/m  as calculated from the following:    Height as of 7/19/24: 1.524 m (5').    Weight as of this encounter: 62.5 kg (137 lb 12.8 oz).     See Patient Instructions        Katie Samuels is a 89 year old, presenting for the following health issues:  Musculoskeletal Problem    History of Present Illness       Reason for visit:  Pain and stiffness    She eats 2-3 servings of fruits and vegetables daily.She consumes 0 sweetened beverage(s) daily.She exercises with enough effort to  "increase her heart rate 9 or less minutes per day.  She exercises with enough effort to increase her heart rate 3 or less days per week.   She is taking medications regularly.     ED/ Followup:  Facility:  [facility]  Date of visit: 10/6/2024  Reason for visit: Pain   Current Status: Getting worse while waning off prednisone     Seen in urgent care 10/6/2024 with recent onset pain in bilateral shoulders and posterior thighs.  Inflammatory markers were elevated, consistent with polymyalgia rheumatica.  She was given a short prednisone taper of 10 mg daily, then 5 mg daily.    Today she reports she took prednisone as directed and she is still feeling \"terrible.\"  Symptoms were better on the 10 mg dose of prednisone; worsened with 5 mg.  She continues to have pain and stiffness in bilateral shoulders and bilateral posterior thighs; not significantly in the hips.  It takes about 4 hours for the stiffness to improve in the morning. It's very difficult to get out of bed and to get dressed in the morning.  No headache pain, eye pain, or vision changes.    Also addressed hypertension; clinic trend has been elevated recently.  Home BP 120s/70s.  She is taking losartan 50 mg once daily.  Denies chest pain/pressure, heart palpitations, lower extremity edema, shortness of breath, or orthopnea      Review of Systems  Constitutional, HEENT, cardiovascular, pulmonary, GI, , musculoskeletal, neuro, skin, endocrine and psych systems are negative, except as otherwise noted.      Objective    BP (!) 140/80 (BP Location: Left arm, Patient Position: Sitting, Cuff Size: Adult Regular)   Pulse 83   Temp 98.6  F (37  C) (Temporal)   Wt 62.5 kg (137 lb 12.8 oz)   SpO2 98%   BMI 26.91 kg/m    Body mass index is 26.91 kg/m .  Physical Exam  Vitals and nursing note reviewed.   Constitutional:       General: She is not in acute distress.     Appearance: Normal appearance. She is not ill-appearing or toxic-appearing.   Cardiovascular:    "   Rate and Rhythm: Normal rate and regular rhythm.      Pulses: Normal pulses.      Heart sounds: Normal heart sounds. No murmur heard.     No friction rub. No gallop.   Pulmonary:      Effort: Pulmonary effort is normal. No respiratory distress.      Breath sounds: Normal breath sounds. No wheezing, rhonchi or rales.   Musculoskeletal:         General: No swelling.      Comments: Stiffness in shoulders and hips. ROM grossly intact. Antalgic gait   Skin:     General: Skin is warm and dry.   Neurological:      General: No focal deficit present.      Mental Status: She is alert and oriented to person, place, and time.   Psychiatric:         Mood and Affect: Mood normal.         Behavior: Behavior normal.         Thought Content: Thought content normal.         Judgment: Judgment normal.           Signed Electronically by: ROBYN Prabhakar CNP

## 2024-11-01 ENCOUNTER — OFFICE VISIT (OUTPATIENT)
Dept: INTERNAL MEDICINE | Facility: CLINIC | Age: 89
End: 2024-11-01
Payer: MEDICARE

## 2024-11-01 VITALS
DIASTOLIC BLOOD PRESSURE: 80 MMHG | WEIGHT: 137.8 LBS | BODY MASS INDEX: 26.91 KG/M2 | HEART RATE: 83 BPM | TEMPERATURE: 98.6 F | SYSTOLIC BLOOD PRESSURE: 140 MMHG | OXYGEN SATURATION: 98 %

## 2024-11-01 DIAGNOSIS — M35.3 POLYMYALGIA RHEUMATICA (H): Primary | ICD-10-CM

## 2024-11-01 DIAGNOSIS — Z79.899 ENCOUNTER FOR LONG-TERM (CURRENT) USE OF MEDICATIONS: ICD-10-CM

## 2024-11-01 LAB
ALBUMIN SERPL BCG-MCNC: 4 G/DL (ref 3.5–5.2)
ALP SERPL-CCNC: 69 U/L (ref 40–150)
ALT SERPL W P-5'-P-CCNC: 18 U/L (ref 0–50)
ANION GAP SERPL CALCULATED.3IONS-SCNC: 10 MMOL/L (ref 7–15)
AST SERPL W P-5'-P-CCNC: 19 U/L (ref 0–45)
BILIRUB SERPL-MCNC: 0.3 MG/DL
BUN SERPL-MCNC: 16.2 MG/DL (ref 8–23)
CALCIUM SERPL-MCNC: 9.3 MG/DL (ref 8.8–10.4)
CHLORIDE SERPL-SCNC: 96 MMOL/L (ref 98–107)
CK SERPL-CCNC: 61 U/L (ref 26–192)
CREAT SERPL-MCNC: 0.87 MG/DL (ref 0.51–0.95)
CRP SERPL-MCNC: 52.3 MG/L
EGFRCR SERPLBLD CKD-EPI 2021: 64 ML/MIN/1.73M2
ERYTHROCYTE [DISTWIDTH] IN BLOOD BY AUTOMATED COUNT: 13.6 % (ref 10–15)
ERYTHROCYTE [SEDIMENTATION RATE] IN BLOOD BY WESTERGREN METHOD: 18 MM/HR (ref 0–30)
GLUCOSE SERPL-MCNC: 94 MG/DL (ref 70–99)
HCO3 SERPL-SCNC: 27 MMOL/L (ref 22–29)
HCT VFR BLD AUTO: 42.3 % (ref 35–47)
HGB BLD-MCNC: 13.5 G/DL (ref 11.7–15.7)
MCH RBC QN AUTO: 29.4 PG (ref 26.5–33)
MCHC RBC AUTO-ENTMCNC: 31.9 G/DL (ref 31.5–36.5)
MCV RBC AUTO: 92 FL (ref 78–100)
PLATELET # BLD AUTO: 330 10E3/UL (ref 150–450)
POTASSIUM SERPL-SCNC: 3.9 MMOL/L (ref 3.4–5.3)
PROT SERPL-MCNC: 7.8 G/DL (ref 6.4–8.3)
RBC # BLD AUTO: 4.59 10E6/UL (ref 3.8–5.2)
RHEUMATOID FACT SERPL-ACNC: <10 IU/ML
SODIUM SERPL-SCNC: 133 MMOL/L (ref 135–145)
TSH SERPL DL<=0.005 MIU/L-ACNC: 2.03 UIU/ML (ref 0.3–4.2)
VIT D+METAB SERPL-MCNC: 40 NG/ML (ref 20–50)
WBC # BLD AUTO: 15 10E3/UL (ref 4–11)

## 2024-11-01 PROCEDURE — 85027 COMPLETE CBC AUTOMATED: CPT | Performed by: NURSE PRACTITIONER

## 2024-11-01 PROCEDURE — 82550 ASSAY OF CK (CPK): CPT | Performed by: NURSE PRACTITIONER

## 2024-11-01 PROCEDURE — 85652 RBC SED RATE AUTOMATED: CPT | Performed by: NURSE PRACTITIONER

## 2024-11-01 PROCEDURE — 86140 C-REACTIVE PROTEIN: CPT | Performed by: NURSE PRACTITIONER

## 2024-11-01 PROCEDURE — 86038 ANTINUCLEAR ANTIBODIES: CPT | Performed by: NURSE PRACTITIONER

## 2024-11-01 PROCEDURE — 86431 RHEUMATOID FACTOR QUANT: CPT | Performed by: NURSE PRACTITIONER

## 2024-11-01 PROCEDURE — 84443 ASSAY THYROID STIM HORMONE: CPT | Performed by: NURSE PRACTITIONER

## 2024-11-01 PROCEDURE — 36415 COLL VENOUS BLD VENIPUNCTURE: CPT | Performed by: NURSE PRACTITIONER

## 2024-11-01 PROCEDURE — 86200 CCP ANTIBODY: CPT | Performed by: NURSE PRACTITIONER

## 2024-11-01 PROCEDURE — 82306 VITAMIN D 25 HYDROXY: CPT | Performed by: NURSE PRACTITIONER

## 2024-11-01 PROCEDURE — 80053 COMPREHEN METABOLIC PANEL: CPT | Performed by: NURSE PRACTITIONER

## 2024-11-01 PROCEDURE — 99214 OFFICE O/P EST MOD 30 MIN: CPT | Performed by: NURSE PRACTITIONER

## 2024-11-01 RX ORDER — PREDNISONE 5 MG/1
TABLET ORAL
Qty: 137 TABLET | Refills: 0 | Status: SHIPPED | OUTPATIENT
Start: 2024-11-01 | End: 2024-12-29

## 2024-11-01 ASSESSMENT — PAIN SCALES - GENERAL: PAINLEVEL_OUTOF10: SEVERE PAIN (6)

## 2024-11-01 NOTE — PATIENT INSTRUCTIONS
Check your blood pressure 1-2x/week to make sure it's not increasing as a result of the prednisone.   Measure your blood pressure first thing in the morning, 30 minutes before you have had any caffeine, nicotine, or exercise, and after sitting calmly for 3-5 minutes. Ensure that your bladder is not full. Avoid talking during blood pressure measurement. Both feet should be flat on the ground and arm resting on the table (upper arm cuff only) or wrist over the heart (wrist cuff only).   Keep a log of your blood pressure readings and bring it to your next appointment.     Watch for symptoms of temporal arteritis (also called giant cell arteritis), which needs to be treated right away. This condition is associated with polymyalgia rheumatica.

## 2024-11-04 LAB
ANA SER QL IF: NEGATIVE
CCP AB SER IA-ACNC: 1 U/ML

## 2025-03-04 ENCOUNTER — TELEPHONE (OUTPATIENT)
Dept: RHEUMATOLOGY | Facility: CLINIC | Age: 89
End: 2025-03-04
Payer: MEDICARE

## 2025-03-04 NOTE — TELEPHONE ENCOUNTER
LVM that Dr. Mandujano will be going on a leave and to reschedule appt on 06/13. Dr. Rob is another provider that sees the same diagnosis and is at the same location. Please forward any CB to [phone]

## 2025-04-18 DIAGNOSIS — I10 HYPERTENSION, UNSPECIFIED TYPE: ICD-10-CM

## 2025-04-21 RX ORDER — LOSARTAN POTASSIUM 50 MG/1
50 TABLET ORAL DAILY
Qty: 90 TABLET | Refills: 0 | Status: SHIPPED | OUTPATIENT
Start: 2025-04-21

## 2025-07-21 ENCOUNTER — OFFICE VISIT (OUTPATIENT)
Dept: INTERNAL MEDICINE | Facility: CLINIC | Age: 89
End: 2025-07-21
Payer: MEDICARE

## 2025-07-21 VITALS
TEMPERATURE: 97.5 F | SYSTOLIC BLOOD PRESSURE: 144 MMHG | DIASTOLIC BLOOD PRESSURE: 70 MMHG | OXYGEN SATURATION: 96 % | HEIGHT: 60 IN | WEIGHT: 139.7 LBS | RESPIRATION RATE: 14 BRPM | HEART RATE: 94 BPM | BODY MASS INDEX: 27.43 KG/M2

## 2025-07-21 DIAGNOSIS — E78.5 HYPERLIPIDEMIA LDL GOAL <130: ICD-10-CM

## 2025-07-21 DIAGNOSIS — Z00.00 MEDICARE ANNUAL WELLNESS VISIT, SUBSEQUENT: Primary | ICD-10-CM

## 2025-07-21 DIAGNOSIS — E87.1 HYPONATREMIA: ICD-10-CM

## 2025-07-21 DIAGNOSIS — I10 HYPERTENSION, UNSPECIFIED TYPE: ICD-10-CM

## 2025-07-21 DIAGNOSIS — J44.9 CHRONIC OBSTRUCTIVE PULMONARY DISEASE, UNSPECIFIED COPD TYPE (H): ICD-10-CM

## 2025-07-21 DIAGNOSIS — M35.3 POLYMYALGIA RHEUMATICA: ICD-10-CM

## 2025-07-21 DIAGNOSIS — H35.30 MACULAR DEGENERATION (SENILE) OF RETINA: ICD-10-CM

## 2025-07-21 PROCEDURE — 99214 OFFICE O/P EST MOD 30 MIN: CPT | Mod: 25 | Performed by: INTERNAL MEDICINE

## 2025-07-21 PROCEDURE — 3078F DIAST BP <80 MM HG: CPT | Performed by: INTERNAL MEDICINE

## 2025-07-21 PROCEDURE — 3077F SYST BP >= 140 MM HG: CPT | Performed by: INTERNAL MEDICINE

## 2025-07-21 PROCEDURE — G0439 PPPS, SUBSEQ VISIT: HCPCS | Performed by: INTERNAL MEDICINE

## 2025-07-21 RX ORDER — PREDNISONE 1 MG/1
TABLET ORAL
COMMUNITY
Start: 2025-07-21

## 2025-07-21 SDOH — HEALTH STABILITY: PHYSICAL HEALTH: ON AVERAGE, HOW MANY DAYS PER WEEK DO YOU ENGAGE IN MODERATE TO STRENUOUS EXERCISE (LIKE A BRISK WALK)?: 0 DAYS

## 2025-07-21 ASSESSMENT — SOCIAL DETERMINANTS OF HEALTH (SDOH): HOW OFTEN DO YOU GET TOGETHER WITH FRIENDS OR RELATIVES?: TWICE A WEEK

## 2025-07-31 ENCOUNTER — LAB (OUTPATIENT)
Dept: LAB | Facility: CLINIC | Age: 89
End: 2025-07-31
Payer: MEDICARE

## 2025-07-31 DIAGNOSIS — M35.3 POLYMYALGIA RHEUMATICA: ICD-10-CM

## 2025-07-31 DIAGNOSIS — E78.5 HYPERLIPIDEMIA LDL GOAL <130: ICD-10-CM

## 2025-07-31 DIAGNOSIS — I10 HYPERTENSION, UNSPECIFIED TYPE: ICD-10-CM

## 2025-07-31 LAB
ALBUMIN SERPL BCG-MCNC: 4.2 G/DL (ref 3.5–5.2)
ALP SERPL-CCNC: 52 U/L (ref 40–150)
ALT SERPL W P-5'-P-CCNC: 19 U/L (ref 0–50)
ANION GAP SERPL CALCULATED.3IONS-SCNC: 12 MMOL/L (ref 7–15)
AST SERPL W P-5'-P-CCNC: 25 U/L (ref 0–45)
BILIRUB SERPL-MCNC: 0.4 MG/DL
BUN SERPL-MCNC: 13.9 MG/DL (ref 8–23)
CALCIUM SERPL-MCNC: 9.7 MG/DL (ref 8.8–10.4)
CHLORIDE SERPL-SCNC: 94 MMOL/L (ref 98–107)
CHOLEST SERPL-MCNC: 273 MG/DL
CREAT SERPL-MCNC: 0.81 MG/DL (ref 0.51–0.95)
CRP SERPL-MCNC: 4.43 MG/L
EGFRCR SERPLBLD CKD-EPI 2021: 70 ML/MIN/1.73M2
ERYTHROCYTE [DISTWIDTH] IN BLOOD BY AUTOMATED COUNT: 13 % (ref 10–15)
ERYTHROCYTE [SEDIMENTATION RATE] IN BLOOD BY WESTERGREN METHOD: 16 MM/HR (ref 0–30)
FASTING STATUS PATIENT QL REPORTED: YES
FASTING STATUS PATIENT QL REPORTED: YES
GLUCOSE SERPL-MCNC: 98 MG/DL (ref 70–99)
HCO3 SERPL-SCNC: 27 MMOL/L (ref 22–29)
HCT VFR BLD AUTO: 40.5 % (ref 35–47)
HDLC SERPL-MCNC: 104 MG/DL
HGB BLD-MCNC: 13.8 G/DL (ref 11.7–15.7)
LDLC SERPL CALC-MCNC: 149 MG/DL
MCH RBC QN AUTO: 31.5 PG (ref 26.5–33)
MCHC RBC AUTO-ENTMCNC: 34.1 G/DL (ref 31.5–36.5)
MCV RBC AUTO: 93 FL (ref 78–100)
NONHDLC SERPL-MCNC: 169 MG/DL
PLATELET # BLD AUTO: 271 10E3/UL (ref 150–450)
POTASSIUM SERPL-SCNC: 4 MMOL/L (ref 3.4–5.3)
PROT SERPL-MCNC: 7.6 G/DL (ref 6.4–8.3)
RBC # BLD AUTO: 4.38 10E6/UL (ref 3.8–5.2)
SODIUM SERPL-SCNC: 133 MMOL/L (ref 135–145)
TRIGL SERPL-MCNC: 101 MG/DL
WBC # BLD AUTO: 11.2 10E3/UL (ref 4–11)

## 2025-08-17 PROBLEM — E87.1 HYPONATREMIA: Status: ACTIVE | Noted: 2025-08-17
